# Patient Record
Sex: MALE | Race: WHITE | Employment: OTHER | URBAN - METROPOLITAN AREA
[De-identification: names, ages, dates, MRNs, and addresses within clinical notes are randomized per-mention and may not be internally consistent; named-entity substitution may affect disease eponyms.]

---

## 2017-01-18 DIAGNOSIS — E78.5 HYPERLIPIDEMIA LDL GOAL <130: Primary | ICD-10-CM

## 2017-01-18 NOTE — TELEPHONE ENCOUNTER
Rosuvastatin (CRESTOR) 40 MG tablet - Received request via fax from pharmacy medication was discontinued found in history  Last Written Prescription Date: 1/6/16  Last Fill Quantity: 90, # refills: 1  Last Office Visit with [facility], P or [facility] prescribing provider: 12/13/16       CHOL      123   12/14/2016  HDL       46   12/14/2016  LDL       34   12/14/2016  LDL       66   7/23/2011  TRIG      215   12/14/2016  CHOLHDLRATIO      3.0   1/12/2014

## 2017-01-19 NOTE — TELEPHONE ENCOUNTER
Looks like patient has been on lipitor for the past year, not sure if this is a formulary change, unable to call out to the pharmacy.

## 2017-01-26 RX ORDER — ROSUVASTATIN CALCIUM 40 MG/1
40 TABLET, COATED ORAL DAILY
Qty: 90 TABLET | Refills: 3 | Status: SHIPPED | OUTPATIENT
Start: 2017-01-26 | End: 2018-02-09

## 2017-01-26 NOTE — TELEPHONE ENCOUNTER
Patient called requesting a refill on his Crestor (see encounters) He states that he is not taking the Lipitor. Please send a new script to the pharmacy.    Emilia Berman,   [facility]

## 2017-01-26 NOTE — TELEPHONE ENCOUNTER
Pt notifies that he tried lipitor last year for a while, didn't like it(doesn't remember what SE), so switched back to Crestor 40 mg qd right back. Would like to make the change in med list & send a new rx to [facility]'s pharmacy.    D/C lipitor from med list. Sent new rx for crestor.    Maurizio, RN  Triage Nurse

## 2017-04-28 DIAGNOSIS — N40.0 BENIGN NON-NODULAR PROSTATIC HYPERPLASIA WITHOUT LOWER URINARY TRACT SYMPTOMS: ICD-10-CM

## 2017-04-28 RX ORDER — FINASTERIDE 5 MG/1
TABLET, FILM COATED ORAL
Qty: 90 TABLET | Refills: 2 | Status: SHIPPED | OUTPATIENT
Start: 2017-04-28 | End: 2017-11-16

## 2017-04-28 NOTE — TELEPHONE ENCOUNTER
Finasteride      Last Written Prescription Date: 12/13/2016  Last Fill Quantity: 90, # refills: 0    Last Office Visit with [facility], [facility] or [facility] prescribing provider:  12/13/2016   Future Office Visit:        BP Readings from Last 3 Encounters:   12/09/16 138/66   01/06/16 130/62   11/18/15 132/62     Prescription approved per [facility] Refill Protocol.    Fatou Anna RN, BSN, PHN

## 2017-07-10 ENCOUNTER — TELEPHONE (OUTPATIENT)
Dept: PEDIATRICS | Facility: CLINIC | Age: 82
End: 2017-07-10

## 2017-07-10 NOTE — TELEPHONE ENCOUNTER
Patient calling and has spots on his head that are tender to touch. States it feels like the skin is split. They are discolored. Appointment scheduled with nay.  Debbie Chu RN

## 2017-08-18 ENCOUNTER — TELEPHONE (OUTPATIENT)
Dept: PEDIATRICS | Facility: CLINIC | Age: 82
End: 2017-08-18

## 2017-08-18 DIAGNOSIS — I12.9 BENIGN HYPERTENSION WITH CKD (CHRONIC KIDNEY DISEASE) STAGE III (H): ICD-10-CM

## 2017-08-18 DIAGNOSIS — N18.30 BENIGN HYPERTENSION WITH CKD (CHRONIC KIDNEY DISEASE) STAGE III (H): ICD-10-CM

## 2017-08-18 RX ORDER — ATENOLOL 50 MG/1
50 TABLET ORAL DAILY
Qty: 30 TABLET | Refills: 0 | Status: SHIPPED | OUTPATIENT
Start: 2017-08-18 | End: 2017-09-29

## 2017-08-18 NOTE — TELEPHONE ENCOUNTER
Received a medication change request from [facility] pharmacy for pt. There's a shortage on atenolol 50 mg tablet, pharmacy is asking to switch pt to metoprolol  mg. Our [place] pharmacy has supplies but is giving out 30 days supply.  Called pt and LMTCB. Please ask pt what he would like to do.  Regla Faulkner, CMA

## 2017-08-29 ENCOUNTER — OFFICE VISIT (OUTPATIENT)
Dept: DERMATOLOGY | Facility: CLINIC | Age: 82
End: 2017-08-29
Payer: MEDICARE

## 2017-08-29 DIAGNOSIS — L82.1 SEBORRHEIC KERATOSIS: ICD-10-CM

## 2017-08-29 DIAGNOSIS — L57.0 AK (ACTINIC KERATOSIS): Primary | ICD-10-CM

## 2017-08-29 PROCEDURE — 17003 DESTRUCT PREMALG LES 2-14: CPT | Performed by: DERMATOLOGY

## 2017-08-29 PROCEDURE — 17000 DESTRUCT PREMALG LESION: CPT | Performed by: DERMATOLOGY

## 2017-08-29 PROCEDURE — 99203 OFFICE O/P NEW LOW 30 MIN: CPT | Mod: 25 | Performed by: DERMATOLOGY

## 2017-08-29 NOTE — LETTER
8/29/2017      RE: Rk Aldana  [address]  [place] [address]       DERMATOLOGY CLINIC VISIT NOTE      CHIEF COMPLAINT:  Scaling spots on head.      DERMATOLOGY PROBLEM LIST:   1.  Actinic keratoses.   2.  Seborrheic keratoses.      HISTORY OF PRESENT ILLNESS:  Mr. Aldana is an 82-year-old male seen in Dermatology Clinic at the request of Jocelyne Boyer for initial evaluation of scaling bumps on his head.  The patient denies past history of skin cancer.  He has had lesions treated with liquid nitrogen previously.  He notes a painful bump on his left scalp and scaly spots on the back of his head.  He also has scaly spots on both cheeks.  No pain or bleeding to facial lesions.      Patient Active Problem List   Diagnosis     BILATERAL CAROTID BRUITS     B-complex deficiency     BPH (benign prostatic hyperplasia)     Diverticulosis of colon with hemorrhage     Hyperlipidemia with target LDL less than 130     Advanced directives, counseling/discussion     CAD (coronary artery disease)     Hx of adenomatous colonic polyps     Prediabetes     Back pain     GERD (gastroesophageal reflux disease)     CKD (chronic kidney disease) stage 3, GFR 30-59 ml/min     Benign hypertension with CKD (chronic kidney disease) stage III     Obesity (BMI 30-39.9)     Left medial knee pain     Obesity       Allergies   Allergen Reactions     Penicillins      Pen-         Current Outpatient Prescriptions   Medication     atenolol (TENORMIN) 50 MG tablet     finasteride (PROSCAR) 5 MG tablet     rosuvastatin (CRESTOR) 40 MG tablet     fenofibrate 54 MG tablet     celecoxib (CELEBREX) 200 MG capsule     tamsulosin (FLOMAX) 0.4 MG capsule     lisinopril-hydrochlorothiazide (PRINZIDE/ZESTORETIC) 20-12.5 MG per tablet     omeprazole (PRILOSEC) 40 MG capsule     naproxen sodium (ANAPROX) 220 MG tablet     multivitamin, therapeutic with minerals (THERA-VIT-M) TABS     CVS VITAMIN B12 1000 MCG OR TABS     aspirin 81 MG tablet     No  current facility-administered medications for this visit.           REVIEW OF SYSTEMS:  Feels well without additional skin concerns today.      SOCIAL HISTORY:  The patient is retired.  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      PHYSICAL EXAMINATION:   There were no vitals taken for this visit.    GENERAL:  The patient is a healthy-appearing 82-year-old male in no distress.   HEENT:  Conjunctivae are clear.   PULMONARY:  Breathing comfortably on room air.   ABDOMEN:  No abdominal distention.   SKIN:  Exam is focused on the scalp, face and neck.  Skin exam is normal except for as follows:   -- Examination of the vertex scalp shows scattered gritty pink papules.   -- Bilateral lateral cheeks with gritty pink papules.   -- Right nasal tip with gritty pink papule.   -- Left parietal scalp with a 5 mm pink papule with overlying white crusting, firm to palpation.   -- Scattered medium and dark brown, waxy papules on the bilateral central cheeks.      ASSESSMENT AND PLAN:   1.  Actinic keratoses.  A total of 8 lesions treated with 10-second freeze/thaw cycle with liquid nitrogen.  Wound care instruction provided.  We will recheck lesion on the left frontal scalp in 2 months' time to ensure resolution.  If not resolved, biopsy would be indicated.     2.  Seborrheic keratoses, benign hyperkeratotic papules.  No treatment advised.      The patient to return in 2 months' time for reevaluation of aks.      Thank you for this consultation.       Lupe Hyatt MD  Dermatology Staff       cc:   Jocelyne Boyer MD   [address] [place]  [address]         LUPE HYATT MD             D: 2017 16:22   T: 2017 07:40   MT: andrea      Name:     BILLY SHELLEY   MRN:      [id number]        Account:      [id number]   :      1935           Service Date: 2017      Document: [id number]

## 2017-08-29 NOTE — NURSING NOTE
"Chief Complaint   Patient presents with     Consult     lesions on scalp that burn when touched, dry spots on both cheeks  tx: none        Initial There were no vitals taken for this visit. Estimated body mass index is 33.62 kg/(m^2) as calculated from the following:    Height as of 12/9/16: 5' 6\" (167.6 cm).    Weight as of 12/9/16: 208 lb 5 oz (94.5 kg).  Medication Reconciliation: complete   Bushra Galvez MA      "

## 2017-08-30 NOTE — PROGRESS NOTES
DERMATOLOGY CLINIC VISIT NOTE      CHIEF COMPLAINT:  Scaling spots on head.      DERMATOLOGY PROBLEM LIST:   1.  Actinic keratoses.   2.  Seborrheic keratoses.      HISTORY OF PRESENT ILLNESS:  Mr. Aldana is an 82-year-old male seen in Dermatology Clinic at the request of Jocelyne Boyer for initial evaluation of scaling bumps on his head.  The patient denies past history of skin cancer.  He has had lesions treated with liquid nitrogen previously.  He notes a painful bump on his left scalp and scaly spots on the back of his head.  He also has scaly spots on both cheeks.  No pain or bleeding to facial lesions.      Patient Active Problem List   Diagnosis     BILATERAL CAROTID BRUITS     B-complex deficiency     BPH (benign prostatic hyperplasia)     Diverticulosis of colon with hemorrhage     Hyperlipidemia with target LDL less than 130     Advanced directives, counseling/discussion     CAD (coronary artery disease)     Hx of adenomatous colonic polyps     Prediabetes     Back pain     GERD (gastroesophageal reflux disease)     CKD (chronic kidney disease) stage 3, GFR 30-59 ml/min     Benign hypertension with CKD (chronic kidney disease) stage III     Obesity (BMI 30-39.9)     Left medial knee pain     Obesity       Allergies   Allergen Reactions     Penicillins      Pen-         Current Outpatient Prescriptions   Medication     atenolol (TENORMIN) 50 MG tablet     finasteride (PROSCAR) 5 MG tablet     rosuvastatin (CRESTOR) 40 MG tablet     fenofibrate 54 MG tablet     celecoxib (CELEBREX) 200 MG capsule     tamsulosin (FLOMAX) 0.4 MG capsule     lisinopril-hydrochlorothiazide (PRINZIDE/ZESTORETIC) 20-12.5 MG per tablet     omeprazole (PRILOSEC) 40 MG capsule     naproxen sodium (ANAPROX) 220 MG tablet     multivitamin, therapeutic with minerals (THERA-VIT-M) TABS     CVS VITAMIN B12 1000 MCG OR TABS     aspirin 81 MG tablet     No current facility-administered medications for this visit.           REVIEW OF  SYSTEMS:  Feels well without additional skin concerns today.      SOCIAL HISTORY:  The patient is retired.  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      PHYSICAL EXAMINATION:   There were no vitals taken for this visit.    GENERAL:  The patient is a healthy-appearing 82-year-old male in no distress.   HEENT:  Conjunctivae are clear.   PULMONARY:  Breathing comfortably on room air.   ABDOMEN:  No abdominal distention.   SKIN:  Exam is focused on the scalp, face and neck.  Skin exam is normal except for as follows:   -- Examination of the vertex scalp shows scattered gritty pink papules.   -- Bilateral lateral cheeks with gritty pink papules.   -- Right nasal tip with gritty pink papule.   -- Left parietal scalp with a 5 mm pink papule with overlying white crusting, firm to palpation.   -- Scattered medium and dark brown, waxy papules on the bilateral central cheeks.      ASSESSMENT AND PLAN:   1.  Actinic keratoses.  A total of 8 lesions treated with 10-second freeze/thaw cycle with liquid nitrogen.  Wound care instruction provided.  We will recheck lesion on the left frontal scalp in 2 months' time to ensure resolution.  If not resolved, biopsy would be indicated.     2.  Seborrheic keratoses, benign hyperkeratotic papules.  No treatment advised.      The patient to return in 2 months' time for reevaluation of aks.      Thank you for this consultation.       Lupe Hyatt MD  Dermatology Staff       cc:   Jocelyne Boyer MD   [address] [place]  [address]         LUPE HYATT MD             D: 2017 16:22   T: 2017 07:40   MT: andrea      Name:     BILLY SHELLEY   MRN:      [id number]        Account:      [id number]   :      1935           Service Date: 2017      Document: [id number]

## 2017-09-22 ENCOUNTER — TELEPHONE (OUTPATIENT)
Dept: PEDIATRICS | Facility: CLINIC | Age: 82
End: 2017-09-22

## 2017-09-22 NOTE — TELEPHONE ENCOUNTER
Patient was stung by a bee a few minutes ago and it is swelling and hurting a bit. Wants to know what he can use as he doesn't have any antihistamines at home. Doesn't want to leave his wife alone. Advised that if he had breathing difficulty, scratchy throat or other concerning symptoms to call 911. He will do this. Advised to try a baking soda paste and ice. Recommended he have someone  some benadryl for him.   Debbie Chu RN

## 2017-09-29 DIAGNOSIS — I12.9 BENIGN HYPERTENSION WITH CKD (CHRONIC KIDNEY DISEASE) STAGE III (H): ICD-10-CM

## 2017-09-29 DIAGNOSIS — N18.30 BENIGN HYPERTENSION WITH CKD (CHRONIC KIDNEY DISEASE) STAGE III (H): ICD-10-CM

## 2017-09-29 RX ORDER — ATENOLOL 50 MG/1
TABLET ORAL
Qty: 30 TABLET | Refills: 0 | Status: SHIPPED | OUTPATIENT
Start: 2017-09-29 | End: 2018-01-18

## 2017-09-29 NOTE — TELEPHONE ENCOUNTER
atenolol (TENORMIN) 50 MG tablet      Last Written Prescription Date: 8/18/2017  Last Fill Quantity: 30, # refills: 0    Last Office Visit with [facility], [facility] or [facility] prescribing provider:  12/13/2016   Future Office Visit:    Next 5 appointments (look out 90 days)     Oct 24, 2017  1:15 PM CDT   [facility]    [address] [place] [id number] [phone]                    BP Readings from Last 3 Encounters:   12/09/16 138/66   01/06/16 130/62   11/18/15 132/62

## 2017-10-10 ENCOUNTER — TELEPHONE (OUTPATIENT)
Dept: PEDIATRICS | Facility: CLINIC | Age: 82
End: 2017-10-10

## 2017-10-10 ENCOUNTER — OFFICE VISIT (OUTPATIENT)
Dept: PEDIATRICS | Facility: CLINIC | Age: 82
End: 2017-10-10
Payer: MEDICARE

## 2017-10-10 VITALS
DIASTOLIC BLOOD PRESSURE: 60 MMHG | BODY MASS INDEX: 32.58 KG/M2 | SYSTOLIC BLOOD PRESSURE: 128 MMHG | WEIGHT: 207.6 LBS | HEART RATE: 68 BPM | HEIGHT: 67 IN | TEMPERATURE: 95.9 F | OXYGEN SATURATION: 94 %

## 2017-10-10 DIAGNOSIS — J20.9 ACUTE BRONCHITIS, UNSPECIFIED ORGANISM: Primary | ICD-10-CM

## 2017-10-10 PROCEDURE — 99214 OFFICE O/P EST MOD 30 MIN: CPT | Performed by: INTERNAL MEDICINE

## 2017-10-10 RX ORDER — AZITHROMYCIN 250 MG/1
TABLET, FILM COATED ORAL
Qty: 6 TABLET | Refills: 0 | Status: SHIPPED | OUTPATIENT
Start: 2017-10-10 | End: 2017-10-21

## 2017-10-10 NOTE — PATIENT INSTRUCTIONS
If things are not improving or getting worse I will need you to come back into clinic for an Xray.

## 2017-10-10 NOTE — PROGRESS NOTES
SUBJECTIVE:   Rk Aldana is a 82 year old male who presents to clinic today for the following health issues:    Rk is a patient with a complex PMHx for the complaint of cough, wheezing, chills, fatigue/malaise, hoarse voice and possible fever. About 1 week ago he developed a sore throat that went away and developed a nonproductive cough. A few days later his wife was hospitalized for pneumonia. Reports his cough hurts really bad, and he has some lightheadedness with cough. He reports getting short of breath after carrying laundry up the stairs. Patient notices cold sensations on his hands. Reports heavy mucus but has not looked at it.  Denies fevers, sinus pressure, maxillofacial pain and chest pain.         RESPIRATORY SYMPTOMS      Duration: 1 week    Description  cough, wheezing, chills, fatigue/malaise, hoarse voice and possible fever, but doesn't take temperature    Severity: moderate    Accompanying signs and symptoms: None    History (predisposing factors):  Wife is in hospital with pneumonia     Precipitating or alleviating factors: None    Therapies tried and outcome:  Guaifenesin, has been helping             Problem list and histories reviewed & adjusted, as indicated.  Additional history: as documented    Patient Active Problem List   Diagnosis     BILATERAL CAROTID BRUITS     B-complex deficiency     BPH (benign prostatic hyperplasia)     Diverticulosis of colon with hemorrhage     Hyperlipidemia with target LDL less than 130     Advanced directives, counseling/discussion     CAD (coronary artery disease)     Hx of adenomatous colonic polyps     Prediabetes     Back pain     GERD (gastroesophageal reflux disease)     CKD (chronic kidney disease) stage 3, GFR 30-59 ml/min     Benign hypertension with CKD (chronic kidney disease) stage III     Obesity (BMI 30-39.9)     Left medial knee pain     Obesity     AK (actinic keratosis)     Seborrheic keratosis     Past Surgical History:   Procedure  Laterality Date     SURGICAL HISTORY OF -   03/27/00    Colonoscopy       Social History   Substance Use Topics     Smoking status: Never Smoker     Smokeless tobacco: Never Used     Alcohol use Yes      Comment: rarely     Family History   Problem Relation Age of Onset     Connective Tissue Disorder Mother      LUPUS     Cancer - colorectal Father      70         Current Outpatient Prescriptions   Medication Sig Dispense Refill     atenolol (TENORMIN) 50 MG tablet TAKE ONE TABLET BY MOUTH EVERY DAY 30 tablet 0     finasteride (PROSCAR) 5 MG tablet TAKE ONE TABLET BY MOUTH ONCE DAILY 90 tablet 2     rosuvastatin (CRESTOR) 40 MG tablet Take 1 tablet (40 mg) by mouth daily 90 tablet 3     fenofibrate 54 MG tablet Take 1 tablet (54 mg) by mouth daily 90 tablet 3     celecoxib (CELEBREX) 200 MG capsule Take 1 capsule (200 mg) by mouth daily as needed for moderate pain 90 capsule 3     tamsulosin (FLOMAX) 0.4 MG capsule Take 1 capsule (0.4 mg) by mouth daily 90 capsule 3     lisinopril-hydrochlorothiazide (PRINZIDE/ZESTORETIC) 20-12.5 MG per tablet Take 2 tablets by mouth daily 180 tablet 3     omeprazole (PRILOSEC) 40 MG capsule Take 1 capsule (40 mg) by mouth daily 90 capsule 3     naproxen sodium (ANAPROX) 220 MG tablet Take 1 tablet (220 mg) by mouth 2 times daily (with meals) 180 tablet 3     multivitamin, therapeutic with minerals (THERA-VIT-M) TABS Take 1 tablet by mouth daily       CVS VITAMIN B12 1000 MCG OR TABS 1 tablet daily       aspirin 81 MG tablet Take by mouth daily.  30 0     Allergies   Allergen Reactions     Penicillins      Pen-     Seasonal Allergies          Reviewed and updated as needed this visit by clinical staffTobacco  Allergies  Meds  Med Hx  Surg Hx  Fam Hx  Soc Hx      Reviewed and updated as needed this visit by Provider         ROS:  Constitutional, HEENT, cardiovascular, pulmonary, gi and gu systems are negative, except as otherwise noted.    GENERAL POSITIVE for malaise   HEENT  "POSITIVE for sore throat   RESP POSITIVE for cough, shortness of breath    NEURO POSITIVE for light headedness, dizziness    This document serves as a record of the services and decisions personally performed and made by Jocelyne Boyer MD. It was created on her behalf by Sri Jenkins, a trained medical scribe. The creation of this document is based the provider's statements to the medical scribe.    Sri Jenkins October 10, 2017 11:18 AM  OBJECTIVE:   /60 (BP Location: Right arm, Patient Position: Sitting, Cuff Size: Adult Regular)  Pulse 68  Temp 95.9  F (35.5  C) (Tympanic)  Ht 1.695 m (5' 6.75\")  Wt 94.2 kg (207 lb 9.6 oz)  SpO2 94%  BMI 32.76 kg/m2  Body mass index is 32.76 kg/(m^2).  GENERAL: , alert and no distress  HENT: ear canals and TM's normal, nose and mouth without ulcers or lesions  NECK: no adenopathy, no asymmetry, masses, or scars and thyroid normal to palpation  RESP: lungs clear to auscultation - no rales, rhonchi or wheezes  CV: regular rate and rhythm, normal S1 S2, no S3 or S4, no murmur, click or rub, no peripheral edema and peripheral pulses strong  PSYCH: mentation appears normal, affect normal/bright      Diagnostic Test Results:  none     ASSESSMENT/PLAN:     (J20.9) Acute bronchitis, unspecified organism  (primary encounter diagnosis)  -- will start Zithromax due to duration and lack of improvement   -reviewed potential side effects of meds  -discussed signs of respiratory distress and reasons to seek further care  -- discussed if sx are not improving or worsening; come back into clinic for chest Xray   Plan: azithromycin (ZITHROMAX) 250 MG tablet             Follow up if symptoms are not improving or worsening     The information in this document, created by the medical scribe for me, accurately reflects the services I personally performed and the decisions made by me. I have reviewed and approved this document for accuracy prior to leaving the patient care " area.  Jocelyne Boyer MD  [facility]

## 2017-10-10 NOTE — TELEPHONE ENCOUNTER
Wondering if he should be seen     Has productive cough for about 1 week. Denies SOB. Denies chest pain/discomfort with cough. Denies weakness. Some fatigue.    Using OTC cough medicine and decongestion for the last week. But cough is lingering on.     Does not seem to have a fever or chills. Has not actually checked his temperature. Wife currently in hospital with pneumonia.    Advised to be seen in clinic for proper lung assessment. Triage overheard cough constant and hacking. Did not overhear patient in distress. Patient AxOx3.      OV scheduled with PCP today. Patient agreed to plan.    Fatou DELGADO RN, BSN, PHN  [place] [facility]

## 2017-10-21 ENCOUNTER — OFFICE VISIT (OUTPATIENT)
Dept: URGENT CARE | Facility: URGENT CARE | Age: 82
End: 2017-10-21
Payer: MEDICARE

## 2017-10-21 ENCOUNTER — NURSE TRIAGE (OUTPATIENT)
Dept: NURSING | Facility: CLINIC | Age: 82
End: 2017-10-21

## 2017-10-21 ENCOUNTER — RADIANT APPOINTMENT (OUTPATIENT)
Dept: GENERAL RADIOLOGY | Facility: CLINIC | Age: 82
End: 2017-10-21
Attending: NURSE PRACTITIONER
Payer: MEDICARE

## 2017-10-21 VITALS
HEART RATE: 64 BPM | SYSTOLIC BLOOD PRESSURE: 126 MMHG | DIASTOLIC BLOOD PRESSURE: 76 MMHG | OXYGEN SATURATION: 95 % | RESPIRATION RATE: 12 BRPM | TEMPERATURE: 98.2 F

## 2017-10-21 DIAGNOSIS — R05.9 COUGH: Primary | ICD-10-CM

## 2017-10-21 LAB
ERYTHROCYTE [DISTWIDTH] IN BLOOD BY AUTOMATED COUNT: 13.4 % (ref 10–15)
HCT VFR BLD AUTO: 40 % (ref 40–53)
HGB BLD-MCNC: 13.3 G/DL (ref 13.3–17.7)
MCH RBC QN AUTO: 31.1 PG (ref 26.5–33)
MCHC RBC AUTO-ENTMCNC: 33.3 G/DL (ref 31.5–36.5)
MCV RBC AUTO: 94 FL (ref 78–100)
PLATELET # BLD AUTO: 222 10E9/L (ref 150–450)
RBC # BLD AUTO: 4.27 10E12/L (ref 4.4–5.9)
WBC # BLD AUTO: 5.8 10E9/L (ref 4–11)

## 2017-10-21 PROCEDURE — 36415 COLL VENOUS BLD VENIPUNCTURE: CPT | Performed by: NURSE PRACTITIONER

## 2017-10-21 PROCEDURE — 85027 COMPLETE CBC AUTOMATED: CPT | Performed by: NURSE PRACTITIONER

## 2017-10-21 PROCEDURE — 71020 XR CHEST 2 VW: CPT

## 2017-10-21 PROCEDURE — 99214 OFFICE O/P EST MOD 30 MIN: CPT | Performed by: NURSE PRACTITIONER

## 2017-10-21 RX ORDER — LEVOFLOXACIN 500 MG/1
500 TABLET, FILM COATED ORAL DAILY
Qty: 7 TABLET | Refills: 0 | Status: SHIPPED | OUTPATIENT
Start: 2017-10-21 | End: 2017-10-21

## 2017-10-21 RX ORDER — BENZONATATE 200 MG/1
200 CAPSULE ORAL 3 TIMES DAILY PRN
Qty: 21 CAPSULE | Refills: 0 | Status: SHIPPED | OUTPATIENT
Start: 2017-10-21 | End: 2018-01-18

## 2017-10-21 RX ORDER — DOXYCYCLINE 100 MG/1
100 CAPSULE ORAL 2 TIMES DAILY
Qty: 20 CAPSULE | Refills: 0 | Status: SHIPPED | OUTPATIENT
Start: 2017-10-21 | End: 2018-01-18

## 2017-10-21 NOTE — PROGRESS NOTES
SUBJECTIVE:  Rk Aldana is a 82 year old male who presents to the clinic today with a chief complaint of cough  and central chest pain (mediastinal, not crushing or radiating, more like a tightness and warmth per patient) for 3 week(s).  His cough is described as persistent, productive but patient is unable to describe color or consistency as he states he swallows it immediately despite knowing he should attempt to expectorate it. The patient's symptoms are moderate and not changing over the course of time.  Associated symptoms include none. The patient's symptoms are exacerbated by no particular triggers. Patient has been using nothing lately, but did have a five-day course of Azithromycin that ended six days ago to improve symptoms. Patient states he does not pay enough attention to know if things got any better after taking the antibiotic.    Past Medical History:   Diagnosis Date     CHEST PAIN NOS     Echo- mild LVH 8/06. GXT cardiolite 8/06- 10.4 mets, 158 HR, nondiagnostic ekg, 1/10 chest pain, cardiolite- small fixed inferior defect consistent with attenuation, EF 67%      HERPES ZOSTER NOS 10/30/2006    Right flank T8     ROTATOR CUFF SYND NOS     Partial tear left supraspinatus MRI 4/04       Current Outpatient Prescriptions   Medication Sig Dispense Refill     azithromycin (ZITHROMAX) 250 MG tablet Two tablets first day, then one tablet daily for four days. 6 tablet 0     atenolol (TENORMIN) 50 MG tablet TAKE ONE TABLET BY MOUTH EVERY DAY 30 tablet 0     finasteride (PROSCAR) 5 MG tablet TAKE ONE TABLET BY MOUTH ONCE DAILY 90 tablet 2     rosuvastatin (CRESTOR) 40 MG tablet Take 1 tablet (40 mg) by mouth daily 90 tablet 3     fenofibrate 54 MG tablet Take 1 tablet (54 mg) by mouth daily 90 tablet 3     celecoxib (CELEBREX) 200 MG capsule Take 1 capsule (200 mg) by mouth daily as needed for moderate pain 90 capsule 3     tamsulosin (FLOMAX) 0.4 MG capsule Take 1 capsule (0.4 mg) by mouth daily 90  "capsule 3     lisinopril-hydrochlorothiazide (PRINZIDE/ZESTORETIC) 20-12.5 MG per tablet Take 2 tablets by mouth daily 180 tablet 3     omeprazole (PRILOSEC) 40 MG capsule Take 1 capsule (40 mg) by mouth daily 90 capsule 3     naproxen sodium (ANAPROX) 220 MG tablet Take 1 tablet (220 mg) by mouth 2 times daily (with meals) 180 tablet 3     multivitamin, therapeutic with minerals (THERA-VIT-M) TABS Take 1 tablet by mouth daily       CVS VITAMIN B12 1000 MCG OR TABS 1 tablet daily       aspirin 81 MG tablet Take by mouth daily.  30 0       Social History   Substance Use Topics     Smoking status: Never Smoker     Smokeless tobacco: Never Used     Alcohol use Yes      Comment: rarely       ROS  Review of systems negative except as stated above.    OBJECTIVE:  /76 (BP Location: Right arm, Patient Position: Sitting, Cuff Size: Adult Regular)  Pulse 64  Temp 98.2  F (36.8  C) (Oral)  Resp 12  SpO2 95%  GENERAL APPEARANCE: healthy, alert and no distress  EYES: EOMI,  PERRL, conjunctiva clear  HENT: ear canals and TM's normal.  Nose and mouth without ulcers, erythema or lesions  NECK: supple, nontender, no lymphadenopathy  CV: regular rates and rhythm, normal S1 S2, no murmur noted  LUNG: Bilateral upper lungs with expiratory wheezes, lower & mid lobes clear  ABDOMEN:  soft, nontender, no HSM or masses and bowel sounds normal  NEURO: gait abnormal slight instability and shuffling gate upon standing (patient notes \"problem with right achilles\") , then straightens out to normal  SKIN: no suspicious lesions or rashes  PSYCH: affect flat, judgment and insight intact, worried - cantankerous     ASSESSMENT:  Symptoms most consistent with acute bronchitis vs. URI vs. Other    X-ray not consistent with pneumonia, CBC WNL.     PLAN: Due to symptom duration of three weeks and recent bacterial pneumonia exposure, started on   Doxycycline and provided with antitussive (tessalon pearls) . Educated patient that if no " improvement likely viral in origin and will not benefit from additional antibiotics.     See orders in Epic  Symptomatic measures encouraged, humidified air, plenty of fluids. Advised to wash hands and wear mask when at TCU visiting wife to prevent spread of infection.    I have reviewed and edited the NP student's documentation acting as scribe and verify that making the history, exam and medical decision making reflect the history, exam and medical decision making preformed by myself today.     Priti Ashley RN, CNP

## 2017-10-21 NOTE — MR AVS SNAPSHOT
After Visit Summary   10/21/2017    Rk Aldana    MRN: [id number]           Patient Information     Date Of Birth          1935        Visit Information        Provider Department      10/21/2017 9:30 AM Priti Ashley APRN CNP [facility]        Today's Diagnoses     Cough    -  1       Follow-ups after your visit        Your next 10 appointments already scheduled     Oct 24, 2017  1:15 PM CDT   Return Visit with Lupe Hyatt MD   [facility]] ([facility]])    [address] [place] [address]  [address]  [facility] [address] [phone]            Oct 24, 2017  2:40 PM CDT   Office Visit with Jocelyne Boyer MD   [facility]3] ([facility]])    [address] [place] [address]  [address]  [facility] [address] [phone]           Bring a current list of meds and any records pertaining to this visit. For Physicals, please bring immunization records and any forms needing to be filled out. Please arrive 10 minutes early to complete paperwork.              Who to contact     If you have questions or need follow up information about today's clinic visit or your schedule please contact [facility] directly at [phone].  Normal or non-critical lab and imaging results will be communicated to you by Adaptive Digital Powerhart, letter or phone within 4 business days after the clinic has received the results. If you do not hear from us within 7 days, please contact the clinic through Adaptive Digital Powerhart or phone. If you have a critical or abnormal lab result, we will notify you by phone as soon as possible.  Submit refill requests through Rebit or call your pharmacy and they will forward the refill request to us. Please allow 3 business days for your refill to be completed.          Additional Information About Your Visit        Adaptive Digital Powerhart Information     Rebit gives you secure access to your electronic health record. If you see a  primary care provider, you can also send messages to your care team and make appointments. If you have questions, please call your primary care clinic.  If you do not have a primary care provider, please call [phone] and they will assist you.        Care EveryWhere ID     This is your Care EveryWhere ID. This could be used by other organizations to access your [facility] medical records  [id number]        Your Vitals Were     Pulse Temperature Respirations Pulse Oximetry          64 98.2  F (36.8  C) (Oral) 12 95%         Blood Pressure from Last 3 Encounters:   10/21/17 126/76   10/10/17 128/60   12/09/16 138/66    Weight from Last 3 Encounters:   10/10/17 207 lb 9.6 oz (94.2 kg)   12/09/16 208 lb 5 oz (94.5 kg)   01/06/16 212 lb 3 oz (96.2 kg)              We Performed the Following     CBC with platelets     XR Chest 2 Views          Today's Medication Changes          These changes are accurate as of: 10/21/17 12:22 PM.  If you have any questions, ask your nurse or doctor.               Start taking these medicines.        Dose/Directions    benzonatate 200 MG capsule   Commonly known as:  TESSALON   Used for:  Cough   Started by:  Priti Ashley APRN CNP        Dose:  200 mg   Take 1 capsule (200 mg) by mouth 3 times daily as needed for cough   Quantity:  21 capsule   Refills:  0       doxycycline 100 MG capsule   Commonly known as:  VIBRAMYCIN   Used for:  Cough   Started by:  Priti Ashley APRN CNP        Dose:  100 mg   Take 1 capsule (100 mg) by mouth 2 times daily   Quantity:  20 capsule   Refills:  0            Where to get your medicines      These medications were sent to [facility] - [address]   [address], [place] [address]     Phone:  [phone]     benzonatate 200 MG capsule    doxycycline 100 MG capsule                Primary Care Provider Office Phone # Fax #    Jocelyne Boyer [phone]  [phone]       [address] [place] [address]        Equal Access to Services     [facility]: Hadii carlos graves mirnatrip Lorenzoali, waaxda lubasiliaadaha, qaybta marizoldanielzohaib hurtadoashleezohaib, waxay idiin hayindigobrock obregonessieendy hurd. So [facility] [phone].    ATENCIÓN: Si habla español, tiene a dickey disposición servicios gratuitos de asistencia lingüística. Llame al [phone].    We comply with applicable federal civil rights laws and [place] laws. We do not discriminate on the basis of race, color, national origin, age, disability, sex, sexual orientation, or gender identity.            Thank you!     Thank you for choosing [facility]  for your care. Our goal is always to provide you with excellent care. Hearing back from our patients is one way we can continue to improve our services. Please take a few minutes to complete the written survey that you may receive in the mail after your visit with us. Thank you!             Your Updated Medication List - Protect others around you: Learn how to safely use, store and throw away your medicines at www.disposemymeds.org.          This list is accurate as of: 10/21/17 12:22 PM.  Always use your most recent med list.                   Brand Name Dispense Instructions for use Diagnosis    aspirin 81 MG tablet     30    Take by mouth daily.        atenolol 50 MG tablet    TENORMIN    30 tablet    TAKE ONE TABLET BY MOUTH EVERY DAY    Benign hypertension with CKD (chronic kidney disease) stage III       benzonatate 200 MG capsule    TESSALON    21 capsule    Take 1 capsule (200 mg) by mouth 3 times daily as needed for cough    Cough       celecoxib 200 MG capsule    celeBREX    90 capsule    Take 1 capsule (200 mg) by mouth daily as needed for moderate pain    Osteoarthritis, unspecified osteoarthritis type, unspecified site       [facility] vitamin  B12 1000 MCG Tabs   Generic drug:  cyanocobalamin      1 tablet daily        doxycycline 100 MG capsule    VIBRAMYCIN     20 capsule    Take 1 capsule (100 mg) by mouth 2 times daily    Cough       fenofibrate 54 MG tablet     90 tablet    Take 1 tablet (54 mg) by mouth daily    Hyperlipidemia LDL goal <130       finasteride 5 MG tablet    PROSCAR    90 tablet    TAKE ONE TABLET BY MOUTH ONCE DAILY    Benign non-nodular prostatic hyperplasia without lower urinary tract symptoms       lisinopril-hydrochlorothiazide 20-12.5 MG per tablet    PRINZIDE/ZESTORETIC    180 tablet    Take 2 tablets by mouth daily    Benign hypertension with CKD (chronic kidney disease) stage III       multivitamin, therapeutic with minerals Tabs tablet      Take 1 tablet by mouth daily        naproxen sodium 220 MG tablet    ANAPROX    180 tablet    Take 1 tablet (220 mg) by mouth 2 times daily (with meals)    Primary osteoarthritis involving multiple joints       omeprazole 40 MG capsule    priLOSEC    90 capsule    Take 1 capsule (40 mg) by mouth daily    Gastroesophageal reflux disease without esophagitis       rosuvastatin 40 MG tablet    CRESTOR    90 tablet    Take 1 tablet (40 mg) by mouth daily    Hyperlipidemia LDL goal <130       tamsulosin 0.4 MG capsule    FLOMAX    90 capsule    Take 1 capsule (0.4 mg) by mouth daily    Benign non-nodular prostatic hyperplasia without lower urinary tract symptoms

## 2017-10-21 NOTE — TELEPHONE ENCOUNTER
Rk has had a cough for three weeks.  Now has congestion and sore throat.  Rk head does not feel warm.  Rk states that his wife also is sick and went to hospital and now TCU.

## 2017-10-21 NOTE — TELEPHONE ENCOUNTER
Reason for Disposition    [1] Continuous (nonstop) coughing interferes with work or school AND [2] no improvement using cough treatment per Care Advice    Additional Information    Negative: Severe difficulty breathing (e.g., struggling for each breath, speaks in single words)    Negative: Bluish lips, tongue, or face now    Negative: [1] Difficulty breathing AND [2] exposure to flames, smoke, or fumes    Negative: [1] Stridor AND [2] difficulty breathing    Negative: Sounds like a life-threatening emergency to the triager    Negative: [1] Previous asthma attacks AND [2] this feels like asthma attack    Negative: Dry (non-productive) cough (i.e., no sputum or minimal clear sputum)    Negative: Chest pain  (Exception: MILD central chest pain, present only when coughing)    Negative: Difficulty breathing    Negative: Patient sounds very sick or weak to the triager    Negative: [1] Coughed up blood AND [2] > 1 tablespoon (15 ml) (Exception: blood-tinged sputum)    Negative: Fever > 103 F (39.4 C)    Negative: [1] Fever > 101 F (38.3 C) AND [2] age > 60    Negative: [1] Fever > 101 F (38.3 C) AND [2] bedridden (e.g., nursing home patient, CVA, chronic illness, recovering from surgery)    Negative: [1] Fever > 100.5 F (38.1 C) AND [2] diabetes mellitus or weak immune system (e.g., HIV positive, cancer chemo, splenectomy, organ transplant, chronic steroids)    Negative: Wheezing is present    Negative: SEVERE coughing spells (e.g., whooping sound after coughing, vomiting after coughing)    Protocols used: COUGH - ACUTE PRODUCTIVE-ADULT-

## 2017-10-24 ENCOUNTER — OFFICE VISIT (OUTPATIENT)
Dept: PEDIATRICS | Facility: CLINIC | Age: 82
End: 2017-10-24
Payer: MEDICARE

## 2017-10-24 ENCOUNTER — OFFICE VISIT (OUTPATIENT)
Dept: DERMATOLOGY | Facility: CLINIC | Age: 82
End: 2017-10-24
Payer: MEDICARE

## 2017-10-24 VITALS — DIASTOLIC BLOOD PRESSURE: 76 MMHG | OXYGEN SATURATION: 95 % | HEART RATE: 64 BPM | SYSTOLIC BLOOD PRESSURE: 126 MMHG

## 2017-10-24 DIAGNOSIS — R05.9 COUGH: Primary | ICD-10-CM

## 2017-10-24 DIAGNOSIS — L82.1 SEBORRHEIC KERATOSIS: Primary | ICD-10-CM

## 2017-10-24 DIAGNOSIS — L57.0 AK (ACTINIC KERATOSIS): ICD-10-CM

## 2017-10-24 PROCEDURE — 99214 OFFICE O/P EST MOD 30 MIN: CPT | Mod: 25 | Performed by: INTERNAL MEDICINE

## 2017-10-24 PROCEDURE — 17003 DESTRUCT PREMALG LES 2-14: CPT | Performed by: DERMATOLOGY

## 2017-10-24 PROCEDURE — 17000 DESTRUCT PREMALG LESION: CPT | Performed by: DERMATOLOGY

## 2017-10-24 PROCEDURE — 99213 OFFICE O/P EST LOW 20 MIN: CPT | Mod: 25 | Performed by: DERMATOLOGY

## 2017-10-24 NOTE — PROGRESS NOTES
DERMATOLOGY CLINIC VISIT NOTE      CHIEF COMPLAINT:  Scaling spots on head.      DERMATOLOGY PROBLEM LIST:   1.  Actinic keratoses.   2.  Seborrheic keratoses.      HISTORY OF PRESENT ILLNESS:  Mr. Aldana is an 82-year-old male seen for follow up of aks. Seen 2 months ago and liquid nitrogen applied. Due to the thickness of a lesion on the L scalp I asked that he come back to have a recheck of the area. Notes ongoing scaling at the site. No bleeding areas. Has scattered bumps that are not symptomatic on the back and face.      Patient Active Problem List   Diagnosis     BILATERAL CAROTID BRUITS     B-complex deficiency     BPH (benign prostatic hyperplasia)     Diverticulosis of colon with hemorrhage     Hyperlipidemia with target LDL less than 130     Advanced directives, counseling/discussion     CAD (coronary artery disease)     Hx of adenomatous colonic polyps     Prediabetes     Back pain     GERD (gastroesophageal reflux disease)     CKD (chronic kidney disease) stage 3, GFR 30-59 ml/min     Benign hypertension with CKD (chronic kidney disease) stage III     Obesity (BMI 30-39.9)     Left medial knee pain     Obesity     AK (actinic keratosis)     Seborrheic keratosis       Allergies   Allergen Reactions     Penicillins      Pen-     Seasonal Allergies          Current Outpatient Prescriptions   Medication     Multiple Vitamins-Minerals (ZINC PO)     Ascorbic Acid (VITAMIN C PO)     GuaiFENesin (MUCINEX PO)     benzonatate (TESSALON) 200 MG capsule     doxycycline (VIBRAMYCIN) 100 MG capsule     atenolol (TENORMIN) 50 MG tablet     finasteride (PROSCAR) 5 MG tablet     rosuvastatin (CRESTOR) 40 MG tablet     fenofibrate 54 MG tablet     celecoxib (CELEBREX) 200 MG capsule     tamsulosin (FLOMAX) 0.4 MG capsule     lisinopril-hydrochlorothiazide (PRINZIDE/ZESTORETIC) 20-12.5 MG per tablet     omeprazole (PRILOSEC) 40 MG capsule     naproxen sodium (ANAPROX) 220 MG tablet     multivitamin, therapeutic with  minerals (THERA-VIT-M) TABS     CVS VITAMIN B12 1000 MCG OR TABS     aspirin 81 MG tablet     No current facility-administered medications for this visit.           REVIEW OF SYSTEMS:  Feels well without additional skin concerns today.      SOCIAL HISTORY:  The patient is retired.  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      PHYSICAL EXAMINATION:   There were no vitals taken for this visit.    GENERAL:  The patient is a healthy-appearing 82-year-old male in no distress.   HEENT:  Conjunctivae are clear.   PULMONARY:  Breathing comfortably on room air.   ABDOMEN:  No abdominal distention.   SKIN:  Exam is focused on the scalp, face, back, chest, and neck.  Skin exam is normal except for as follows:   -- Examination of the vertex scalp shows scattered gritty pink papules.   -- L lower cheek with scaly papule  -- Right nasal tip with gritty pink papule.   -- Left frontal scalp with gritty papule   -- Scattered medium and dark brown, waxy papules on the bilateral central cheeks, upper and lower back, upper chest, abdomen     ASSESSMENT AND PLAN:   1.  Actinic keratoses.  A total of 6 lesions treated with 10-second freeze/thaw cycle with liquid nitrogen.  Wound care instruction provided.  Lesion on the L scalp is very flat without induration today.     2.  Seborrheic keratoses, benign hyperkeratotic papules.  No treatment advised.      The patient to return in 12 months' time for reevaluation, sooner prn.           Lupe Hyatt MD  Dermatology Staff       cc:   Jocelyne Boyer MD   [address] [place]  [address]         LUPE HYATT MD             D: 2017 16:22   T: 2017 07:40   MT: andrea      Name:     BILLY SHELLEY   MRN:      [id number]        Account:      [id number]   :      1935           Service Date: 2017      Document: [id number]

## 2017-10-24 NOTE — MR AVS SNAPSHOT
After Visit Summary   10/24/2017    Rk Aldana    MRN: [id number]           Patient Information     Date Of Birth          1935        Visit Information        Provider Department      10/24/2017 2:40 PM Jocelyne Boyer MD [facility]        Today's Diagnoses     Cough    -  1      Care Instructions    If you feel like there is an acute change or she is worsening then you can ask the nurses to have her go back into the emergency room.     Ask to meet with the nurse practitioner and the care team meeting to see what they think about hospice or other care.             Follow-ups after your visit        Who to contact     If you have questions or need follow up information about today's clinic visit or your schedule please contact [facility] directly at [phone].  Normal or non-critical lab and imaging results will be communicated to you by MyChart, letter or phone within 4 business days after the clinic has received the results. If you do not hear from us within 7 days, please contact the clinic through Knodahart or phone. If you have a critical or abnormal lab result, we will notify you by phone as soon as possible.  Submit refill requests through BalaBit or call your pharmacy and they will forward the refill request to us. Please allow 3 business days for your refill to be completed.          Additional Information About Your Visit        MyChart Information     BalaBit gives you secure access to your electronic health record. If you see a primary care provider, you can also send messages to your care team and make appointments. If you have questions, please call your primary care clinic.  If you do not have a primary care provider, please call [phone] and they will assist you.        Care EveryWhere ID     This is your Care EveryWhere ID. This could be used by other organizations to access your [facility] medical records  [id number]        Your Vitals  Were     Pulse Pulse Oximetry                64 95%           Blood Pressure from Last 3 Encounters:   10/24/17 126/76   10/21/17 126/76   10/10/17 128/60    Weight from Last 3 Encounters:   10/10/17 207 lb 9.6 oz (94.2 kg)   12/09/16 208 lb 5 oz (94.5 kg)   01/06/16 212 lb 3 oz (96.2 kg)               Primary Care Provider Office Phone # Fax #    Jocelyne Boyer [phone] [address] [place] [address]        Equal Access to Services     [facility]: Hadii aad ku hadasho Soomaali, waaxda luqadaha, qaybta kaalmada adeegyada, fam stewart hayhiram carroin . So [facility] [phone].    ATENCIÓN: Si habla español, tiene a dickey disposición servicios gratuitos de asistencia lingüística. [facility] [phone].    We comply with applicable federal civil rights laws and [place] laws. We do not discriminate on the basis of race, color, national origin, age, disability, sex, sexual orientation, or gender identity.            Thank you!     [facility]. Our goal is always to provide you with excellent care. Hearing back from our patients is one way we can continue to improve our services. Please take a few minutes to complete the written survey that you may receive in the mail after your visit with us. Thank you!             Your Updated Medication List - Protect others around you: Learn how to safely use, store and throw away your medicines at www.disposemymeds.org.          This list is accurate as of: 10/24/17  3:02 PM.  Always use your most recent med list.                   Brand Name Dispense Instructions for use Diagnosis    aspirin 81 MG tablet     30    Take by mouth daily.        atenolol 50 MG tablet    TENORMIN    30 tablet    TAKE ONE TABLET BY MOUTH EVERY DAY    Benign hypertension with CKD (chronic kidney disease) stage III       benzonatate 200 MG capsule    TESSALON    21 capsule    Take 1 capsule (200  mg) by mouth 3 times daily as needed for cough    Cough       celecoxib 200 MG capsule    celeBREX    90 capsule    Take 1 capsule (200 mg) by mouth daily as needed for moderate pain    Osteoarthritis, unspecified osteoarthritis type, unspecified site       CVS vitamin  B12 1000 MCG Tabs   Generic drug:  cyanocobalamin      1 tablet daily        doxycycline 100 MG capsule    VIBRAMYCIN    20 capsule    Take 1 capsule (100 mg) by mouth 2 times daily    Cough       fenofibrate 54 MG tablet     90 tablet    Take 1 tablet (54 mg) by mouth daily    Hyperlipidemia LDL goal <130       finasteride 5 MG tablet    PROSCAR    90 tablet    TAKE ONE TABLET BY MOUTH ONCE DAILY    Benign non-nodular prostatic hyperplasia without lower urinary tract symptoms       lisinopril-hydrochlorothiazide 20-12.5 MG per tablet    PRINZIDE/ZESTORETIC    180 tablet    Take 2 tablets by mouth daily    Benign hypertension with CKD (chronic kidney disease) stage III       MUCINEX PO           multivitamin, therapeutic with minerals Tabs tablet      Take 1 tablet by mouth daily        naproxen sodium 220 MG tablet    ANAPROX    180 tablet    Take 1 tablet (220 mg) by mouth 2 times daily (with meals)    Primary osteoarthritis involving multiple joints       omeprazole 40 MG capsule    priLOSEC    90 capsule    Take 1 capsule (40 mg) by mouth daily    Gastroesophageal reflux disease without esophagitis       rosuvastatin 40 MG tablet    CRESTOR    90 tablet    Take 1 tablet (40 mg) by mouth daily    Hyperlipidemia LDL goal <130       tamsulosin 0.4 MG capsule    FLOMAX    90 capsule    Take 1 capsule (0.4 mg) by mouth daily    Benign non-nodular prostatic hyperplasia without lower urinary tract symptoms       VITAMIN C PO           ZINC PO

## 2017-10-24 NOTE — LETTER
10/24/2017      RE: Rk Aldana  [address] [address]       DERMATOLOGY CLINIC VISIT NOTE      CHIEF COMPLAINT:  Scaling spots on head.      DERMATOLOGY PROBLEM LIST:   1.  Actinic keratoses.   2.  Seborrheic keratoses.      HISTORY OF PRESENT ILLNESS:  Mr. Aldana is an 82-year-old male seen for follow up of aks. Seen 2 months ago and liquid nitrogen applied. Due to the thickness of a lesion on the L scalp I asked that he come back to have a recheck of the area. Notes ongoing scaling at the site. No bleeding areas. Has scattered bumps that are not symptomatic on the back and face.      Patient Active Problem List   Diagnosis     BILATERAL CAROTID BRUITS     B-complex deficiency     BPH (benign prostatic hyperplasia)     Diverticulosis of colon with hemorrhage     Hyperlipidemia with target LDL less than 130     Advanced directives, counseling/discussion     CAD (coronary artery disease)     Hx of adenomatous colonic polyps     Prediabetes     Back pain     GERD (gastroesophageal reflux disease)     CKD (chronic kidney disease) stage 3, GFR 30-59 ml/min     Benign hypertension with CKD (chronic kidney disease) stage III     Obesity (BMI 30-39.9)     Left medial knee pain     Obesity     AK (actinic keratosis)     Seborrheic keratosis       Allergies   Allergen Reactions     Penicillins      Pen-     Seasonal Allergies          Current Outpatient Prescriptions   Medication     Multiple Vitamins-Minerals (ZINC PO)     Ascorbic Acid (VITAMIN C PO)     GuaiFENesin (MUCINEX PO)     benzonatate (TESSALON) 200 MG capsule     doxycycline (VIBRAMYCIN) 100 MG capsule     atenolol (TENORMIN) 50 MG tablet     finasteride (PROSCAR) 5 MG tablet     rosuvastatin (CRESTOR) 40 MG tablet     fenofibrate 54 MG tablet     celecoxib (CELEBREX) 200 MG capsule     tamsulosin (FLOMAX) 0.4 MG capsule     lisinopril-hydrochlorothiazide (PRINZIDE/ZESTORETIC) 20-12.5 MG per tablet     omeprazole (PRILOSEC) 40 MG capsule      naproxen sodium (ANAPROX) 220 MG tablet     multivitamin, therapeutic with minerals (THERA-VIT-M) TABS     CVS VITAMIN B12 1000 MCG OR TABS     aspirin 81 MG tablet     No current facility-administered medications for this visit.           REVIEW OF SYSTEMS:  Feels well without additional skin concerns today.      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      PHYSICAL EXAMINATION:   There were no vitals taken for this visit.    GENERAL:  The patient is a healthy-appearing 82-year-old male in no distress.   HEENT:  Conjunctivae are clear.   PULMONARY:  Breathing comfortably on room air.   ABDOMEN:  No abdominal distention.   SKIN:  Exam is focused on the scalp, face, back, chest, and neck.  Skin exam is normal except for as follows:   -- Examination of the vertex scalp shows scattered gritty pink papules.   -- L lower cheek with scaly papule  -- Right nasal tip with gritty pink papule.   -- Left frontal scalp with gritty papule   -- Scattered medium and dark brown, waxy papules on the bilateral central cheeks, upper and lower back, upper chest, abdomen     ASSESSMENT AND PLAN:   1.  Actinic keratoses.  A total of 6 lesions treated with 10-second freeze/thaw cycle with liquid nitrogen.  Wound care instruction provided.  Lesion on the L scalp is very flat without induration today.     2.  Seborrheic keratoses, benign hyperkeratotic papules.  No treatment advised.      The patient to return in 12 months' time for reevaluation, sooner prn.           Lupe Hyatt MD  Dermatology Staff       cc:   Jocelyne Boyer MD   [facility] [address] [place]  [address]         D: 2017 16:22   T: 2017 07:40   MT: andrea      Name:     BILLY SHELLEY   MRN:      [id number]        Account:      [id number]   :      1935           Service Date: 2017      Document: [id number]

## 2017-10-24 NOTE — MR AVS SNAPSHOT
After Visit Summary   10/24/2017    Rk Aldana    MRN: [id number]           Patient Information     Date Of Birth          1935        Visit Information        Provider Department      10/24/2017 1:15 PM Lupe Hyatt MD [facility]        Today's Diagnoses     Seborrheic keratosis    -  1    AK (actinic keratosis)           Follow-ups after your visit        Your next 10 appointments already scheduled     Oct 24, 2017  2:40 PM CDT   Office Visit with Jocelyne Boyer MD   [facility] ([facility])    [address] [place] [address]  [address]  [facility] [address]   [phone]           Bring a current list of meds and any records pertaining to this visit. For Physicals, please bring immunization records and any forms needing to be filled out. Please arrive 10 minutes early to complete paperwork.              Who to contact     If you have questions or need follow up information about today's clinic visit or your schedule please contact [facility] directly at [phone].  Normal or non-critical lab and imaging results will be communicated to you by CloudOnhart, letter or phone within 4 business days after the clinic has received the results. If you do not hear from us within 7 days, please contact the clinic through [facility] [employer] or phone. If you have a critical or abnormal lab result, we will notify you by phone as soon as possible.  Submit refill requests through [employer] or call your pharmacy and they will forward the refill request to us. Please allow 3 business days for your refill to be completed.          Additional Information About Your Visit        CloudOnhart Information     [employer] gives you secure access to your electronic health record. If you see a primary care provider, you can also send messages to your care team and make appointments. If you have questions, please call your primary care clinic.  If you do not have a primary  care provider, please call [phone] and they will assist you.        Care EveryWhere ID     This is your Care EveryWhere ID. This could be used by other organizations to access your [place] medical records  [id number]         Blood Pressure from Last 3 Encounters:   10/21/17 126/76   10/10/17 128/60   12/09/16 138/66    Weight from Last 3 Encounters:   10/10/17 207 lb 9.6 oz (94.2 kg)   12/09/16 208 lb 5 oz (94.5 kg)   01/06/16 212 lb 3 oz (96.2 kg)              We Performed the Following     DESTRUCT PREMALIGNANT LESION, 2-14     DESTRUCT PREMALIGNANT LESION, FIRST        Primary Care Provider Office Phone # Fax #    Jocelyne Boyer [phone] [address] [place] [address]        Equal Access to Services     [facility]: Hadii aad ku hadasho Soomaali, waaxda luqadaha, qaybta kaalmada adeegyada, waxay jennain hayindigon genesis carrion . So [facility] [phone].    ATENCIÓN: Si habla español, tiene a dickey disposición servicios gratuitos de asistencia lingüística. Llame al [phone].    We comply with applicable federal civil rights laws and [place] laws. We do not discriminate on the basis of race, color, national origin, age, disability, sex, sexual orientation, or gender identity.            Thank you!     [facility] you [facility]  for your care. Our goal is always to provide you with excellent care. Hearing back from our patients is one way we can continue to improve our services. Please take a few minutes to complete the written survey that you may receive in the mail after your visit with us. Thank you!             Your Updated Medication List - Protect others around you: Learn how to safely use, store and throw away your medicines at www.disposemymeds.org.          This list is accurate as of: 10/24/17  2:01 PM.  Always use your most recent med list.                   Brand Name Dispense Instructions for use Diagnosis    aspirin 81  MG tablet     30    Take by mouth daily.        atenolol 50 MG tablet    TENORMIN    30 tablet    TAKE ONE TABLET BY MOUTH EVERY DAY    Benign hypertension with CKD (chronic kidney disease) stage III       benzonatate 200 MG capsule    TESSALON    21 capsule    Take 1 capsule (200 mg) by mouth 3 times daily as needed for cough    Cough       celecoxib 200 MG capsule    celeBREX    90 capsule    Take 1 capsule (200 mg) by mouth daily as needed for moderate pain    Osteoarthritis, unspecified osteoarthritis type, unspecified site       [employer] vitamin  B12 1000 MCG Tabs   Generic drug:  cyanocobalamin      1 tablet daily        doxycycline 100 MG capsule    VIBRAMYCIN    20 capsule    Take 1 capsule (100 mg) by mouth 2 times daily    Cough       fenofibrate 54 MG tablet     90 tablet    Take 1 tablet (54 mg) by mouth daily    Hyperlipidemia LDL goal <130       finasteride 5 MG tablet    PROSCAR    90 tablet    TAKE ONE TABLET BY MOUTH ONCE DAILY    Benign non-nodular prostatic hyperplasia without lower urinary tract symptoms       lisinopril-hydrochlorothiazide 20-12.5 MG per tablet    PRINZIDE/ZESTORETIC    180 tablet    Take 2 tablets by mouth daily    Benign hypertension with CKD (chronic kidney disease) stage III       MUCINEX PO           multivitamin, therapeutic with minerals Tabs tablet      Take 1 tablet by mouth daily        naproxen sodium 220 MG tablet    ANAPROX    180 tablet    Take 1 tablet (220 mg) by mouth 2 times daily (with meals)    Primary osteoarthritis involving multiple joints       omeprazole 40 MG capsule    priLOSEC    90 capsule    Take 1 capsule (40 mg) by mouth daily    Gastroesophageal reflux disease without esophagitis       rosuvastatin 40 MG tablet    CRESTOR    90 tablet    Take 1 tablet (40 mg) by mouth daily    Hyperlipidemia LDL goal <130       tamsulosin 0.4 MG capsule    FLOMAX    90 capsule    Take 1 capsule (0.4 mg) by mouth daily    Benign non-nodular prostatic hyperplasia  without lower urinary tract symptoms       VITAMIN C PO           ZINC PO

## 2017-10-24 NOTE — NURSING NOTE
"Chief Complaint   Patient presents with     RECHECK     2 month f/u -healed pretty well and facial and head check        Initial There were no vitals taken for this visit. Estimated body mass index is 32.76 kg/(m^2) as calculated from the following:    Height as of 10/10/17: 5' 6.75\" (169.5 cm).    Weight as of 10/10/17: 207 lb 9.6 oz (94.2 kg).  Medication Reconciliation: complete   Bushra Galvez MA      "

## 2017-10-24 NOTE — PROGRESS NOTES
"  SUBJECTIVE:   Rk Aldana is a 82 year old male who presents to clinic today for the following health issues:    On 10/21 patient was evaluated by UC for the complaint of a cough. A chest XR was preformed without indication of pneumonia. He was started on Doxycycline and tessalon pearls.     Since then he reports he has been the same. States he continues to have a cough and is still feeling \"punk\" and fatigued. Patient gets warmth in his chest when he coughs. His wife is in the hospital with a similar illness and he wants to get better. Reports a difference in mobility since using a cane. He kept his appointment today so he could talk about his wife.     He is wondering why his wife was released from the hospital since her oxygen need continued to increase.They discharged her to [facility]  and he is unsure as to if she is in the right place since she has developed new sx. Reports she is unable to swallow or eat on her own. Her potassium is low and she does not eliminate a lot. He last talked to the nurse practitioner yesterday.     RESPIRATORY SYMPTOMS      Duration: 3 weeks     Description  cough    Severity: moderate    Accompanying signs and symptoms: None    History (predisposing factors):  none    Precipitating or alleviating factors: None    Therapies tried and outcome:  rest and fluids and azithromycin, tessalon working well.  Feels like he is getting better     Problem list and histories reviewed & adjusted, as indicated.  Additional history: as documented    Patient Active Problem List   Diagnosis     BILATERAL CAROTID BRUITS     B-complex deficiency     BPH (benign prostatic hyperplasia)     Diverticulosis of colon with hemorrhage     Hyperlipidemia with target LDL less than 130     Advanced directives, counseling/discussion     CAD (coronary artery disease)     Hx of adenomatous colonic polyps     Prediabetes     Back pain     GERD (gastroesophageal reflux disease)     CKD (chronic kidney disease) " stage 3, GFR 30-59 ml/min     Benign hypertension with CKD (chronic kidney disease) stage III     Obesity (BMI 30-39.9)     Left medial knee pain     Obesity     AK (actinic keratosis)     Seborrheic keratosis     Past Surgical History:   Procedure Laterality Date     SURGICAL HISTORY OF -   03/27/00    Colonoscopy       Social History   Substance Use Topics     Smoking status: Never Smoker     Smokeless tobacco: Never Used     Alcohol use Yes      Comment: rarely     Family History   Problem Relation Age of Onset     Connective Tissue Disorder Mother      LUPUS     Cancer - colorectal Father      70         Current Outpatient Prescriptions   Medication Sig Dispense Refill     Multiple Vitamins-Minerals (ZINC PO)        Ascorbic Acid (VITAMIN C PO)        GuaiFENesin (MUCINEX PO)        benzonatate (TESSALON) 200 MG capsule Take 1 capsule (200 mg) by mouth 3 times daily as needed for cough 21 capsule 0     doxycycline (VIBRAMYCIN) 100 MG capsule Take 1 capsule (100 mg) by mouth 2 times daily 20 capsule 0     atenolol (TENORMIN) 50 MG tablet TAKE ONE TABLET BY MOUTH EVERY DAY 30 tablet 0     finasteride (PROSCAR) 5 MG tablet TAKE ONE TABLET BY MOUTH ONCE DAILY 90 tablet 2     rosuvastatin (CRESTOR) 40 MG tablet Take 1 tablet (40 mg) by mouth daily 90 tablet 3     fenofibrate 54 MG tablet Take 1 tablet (54 mg) by mouth daily 90 tablet 3     celecoxib (CELEBREX) 200 MG capsule Take 1 capsule (200 mg) by mouth daily as needed for moderate pain 90 capsule 3     tamsulosin (FLOMAX) 0.4 MG capsule Take 1 capsule (0.4 mg) by mouth daily 90 capsule 3     lisinopril-hydrochlorothiazide (PRINZIDE/ZESTORETIC) 20-12.5 MG per tablet Take 2 tablets by mouth daily 180 tablet 3     omeprazole (PRILOSEC) 40 MG capsule Take 1 capsule (40 mg) by mouth daily 90 capsule 3     naproxen sodium (ANAPROX) 220 MG tablet Take 1 tablet (220 mg) by mouth 2 times daily (with meals) 180 tablet 3     multivitamin, therapeutic with minerals  (THERA-VIT-M) TABS Take 1 tablet by mouth daily       CVS VITAMIN B12 1000 MCG OR TABS 1 tablet daily       aspirin 81 MG tablet Take by mouth daily.  30 0     Allergies   Allergen Reactions     Penicillins      Pen-     Seasonal Allergies      BP Readings from Last 3 Encounters:   10/24/17 126/76   10/21/17 126/76   10/10/17 128/60    Wt Readings from Last 3 Encounters:   10/10/17 94.2 kg (207 lb 9.6 oz)   12/09/16 94.5 kg (208 lb 5 oz)   01/06/16 96.2 kg (212 lb 3 oz)                        Reviewed and updated as needed this visit by clinical staffTobacco  Meds  Med Hx  Surg Hx  Fam Hx  Soc Hx      Reviewed and updated as needed this visit by Provider         ROS:  Constitutional, HEENT, cardiovascular, pulmonary, gi and gu systems are negative, except as otherwise noted.    GENERAL POSITIVE for fatigue   RESP POSITIVE for cough     This document serves as a record of the services and decisions personally performed and made by Jocelyne Boyer MD. It was created on her behalf by Sri Jenkins, a trained medical scribe. The creation of this document is based the provider's statements to the medical scribe.    Sri Jenkins October 24, 2017 2:17 PM  OBJECTIVE:   /76  Pulse 64  SpO2 95%  There is no height or weight on file to calculate BMI.  GENERAL: healthy, alert and no distress  RESP: lungs clear to auscultation - no rales, rhonchi or wheezes  CV: regular rate and rhythm, normal S1 S2, no S3 or S4, no murmur, click or rub  PSYCH: mentation appears normal, affect normal/bright    Diagnostic Test Results:  none     ASSESSMENT/PLAN:   I spent 30 min face to face with patient over 50% in counseling on issues as detailed below.        (R05) Cough  (primary encounter diagnosis)  -- patient was seen by UC; chest XR NEG   -- advised patient to continue on doxycycline and progression of illness   -- if sx do not improve advised to come back into clinic   Plan: CANCELED: XR Chest 2 Views           Follow  up if symptoms are not improving or worsening.     The information in this document, created by the medical scribe for me, accurately reflects the services I personally performed and the decisions made by me. I have reviewed and approved this document for accuracy prior to leaving the patient care area.  Jocelyne Boyer MD  [facility]

## 2017-10-24 NOTE — NURSING NOTE
"No chief complaint on file.      Initial /76  Pulse 64  SpO2 95% Estimated body mass index is 32.76 kg/(m^2) as calculated from the following:    Height as of 10/10/17: 5' 6.75\" (1.695 m).    Weight as of 10/10/17: 207 lb 9.6 oz (94.2 kg).  Medication Reconciliation: complete     Martha Kaminski      "

## 2017-10-24 NOTE — PATIENT INSTRUCTIONS
If you feel like there is an acute change or she is worsening then you can ask the nurses to have her go back into the emergency room.     Ask to meet with the nurse practitioner and the care team meeting to see what they think about hospice or other care.

## 2017-11-16 DIAGNOSIS — N40.0 BENIGN NON-NODULAR PROSTATIC HYPERPLASIA WITHOUT LOWER URINARY TRACT SYMPTOMS: ICD-10-CM

## 2017-11-21 RX ORDER — FINASTERIDE 5 MG/1
TABLET, FILM COATED ORAL
Qty: 90 TABLET | Refills: 3 | Status: SHIPPED | OUTPATIENT
Start: 2017-11-21 | End: 2018-11-29

## 2017-11-21 NOTE — TELEPHONE ENCOUNTER
Prescription approved per [facility] Refill Protocol.  Dafne San RN

## 2017-12-21 ENCOUNTER — TELEPHONE (OUTPATIENT)
Dept: PEDIATRICS | Facility: CLINIC | Age: 82
End: 2017-12-21

## 2017-12-21 DIAGNOSIS — E78.5 HYPERLIPIDEMIA LDL GOAL <130: ICD-10-CM

## 2017-12-21 DIAGNOSIS — N18.30 BENIGN HYPERTENSION WITH CKD (CHRONIC KIDNEY DISEASE) STAGE III (H): Primary | ICD-10-CM

## 2017-12-21 DIAGNOSIS — I12.9 BENIGN HYPERTENSION WITH CKD (CHRONIC KIDNEY DISEASE) STAGE III (H): Primary | ICD-10-CM

## 2017-12-21 NOTE — TELEPHONE ENCOUNTER
Requested Prescriptions   Pending Prescriptions Disp Refills     fenofibrate 54 MG tablet [Pharmacy Med Name: FENOFIBRATE 54MG    TAB]    Last Written Prescription Date:  12/13/2016  Last Fill Quantity: 90 tablet,  # refills: 3   Last Office Visit with [facility], [facility] or [facility] prescribing provider:  10/24/2017   Future Office Visit:      90 tablet 3     Sig: TAKE ONE TABLET BY MOUTH ONCE DAILY    Fibrates Failed    12/21/2017  5:30 AM       Failed - Lipid panel on file in past 12 months    Recent Labs   Lab Test  12/14/16   0721   01/12/14   0714   CHOL  123   < >  110   TRIG  215*   < >  231*   HDL  46   < >  37*   LDL  34   < >  27   VLDL   --    --   46*   CHOLHDLRATIO   --    --   3.0    < > = values in this interval not displayed.              Passed - No abnormal creatine kinase in past 12 months    No lab results found.         Passed - Recent or future visit with authorizing provider    Patient had office visit in the last year or has a visit in the next 30 days with authorizing provider.  See chart review.              Passed - Patient is age 18 or older        atenolol (TENORMIN) 100 MG tablet [Pharmacy Med Name: ATENOLOL 100MG      TAB]    Last Written Prescription Date:  9/29/2017  Last Fill Quantity: 30 tablet,  # refills: 0   Last Office Visit with [facility], [facility] or [facility] prescribing provider:  10/24/2017   Future Office Visit:      15 tablet 5     Sig: TAKE ONE-HALF TABLET BY MOUTH ONCE DAILY    Beta-Blockers Protocol Passed    12/21/2017  5:30 AM       Passed - Blood pressure under 140/90    BP Readings from Last 3 Encounters:   10/24/17 126/76   10/21/17 126/76   10/10/17 128/60                Passed - Patient is age 6 or older       Passed - Recent or future visit with authorizing provider's specialty    Patient had office visit in the last year or has a visit in the next 30 days with authorizing provider.  See chart review.

## 2017-12-26 DIAGNOSIS — I12.9 BENIGN HYPERTENSION WITH CKD (CHRONIC KIDNEY DISEASE) STAGE III (H): ICD-10-CM

## 2017-12-26 DIAGNOSIS — N18.30 BENIGN HYPERTENSION WITH CKD (CHRONIC KIDNEY DISEASE) STAGE III (H): ICD-10-CM

## 2017-12-26 RX ORDER — ATENOLOL 50 MG/1
50 TABLET ORAL DAILY
Qty: 30 TABLET | Refills: 0 | Status: CANCELLED | OUTPATIENT
Start: 2017-12-26

## 2017-12-26 NOTE — TELEPHONE ENCOUNTER
Medication Detail      Disp Refills Start End AUGUST   atenolol (TENORMIN) 50 MG tablet 30 tablet 0 9/29/2017  No

## 2017-12-27 RX ORDER — FENOFIBRATE 54 MG/1
TABLET ORAL
Qty: 90 TABLET | Refills: 0 | Status: SHIPPED | OUTPATIENT
Start: 2017-12-27 | End: 2018-04-09

## 2017-12-27 RX ORDER — ATENOLOL 100 MG/1
TABLET ORAL
Qty: 45 TABLET | Refills: 0 | Status: SHIPPED | OUTPATIENT
Start: 2017-12-27 | End: 2018-04-09

## 2017-12-27 NOTE — TELEPHONE ENCOUNTER
Duplicate.     Requested Prescriptions   Pending Prescriptions Disp Refills     atenolol (TENORMIN) 50 MG tablet    Last Written Prescription Date:  12/27/2017  Last Fill Quantity: 45,  # refills: 0   Last Office Visit with [facility] or [facility] prescribing provider:  10/24/2017   Future Office Visit:      30 tablet 0     Sig: Take 1 tablet (50 mg) by mouth daily    Beta-Blockers Protocol Passed    12/26/2017  4:49 PM       Passed - Blood pressure under 140/90    BP Readings from Last 3 Encounters:   10/24/17 126/76   10/21/17 126/76   10/10/17 128/60                Passed - Patient is age 6 or older       Passed - Recent or future visit with authorizing provider's specialty    Patient had office visit in the last year or has a visit in the next 30 days with authorizing provider.  See chart review.

## 2018-01-12 DIAGNOSIS — M19.90 OSTEOARTHRITIS, UNSPECIFIED OSTEOARTHRITIS TYPE, UNSPECIFIED SITE: ICD-10-CM

## 2018-01-12 DIAGNOSIS — N18.30 BENIGN HYPERTENSION WITH CKD (CHRONIC KIDNEY DISEASE) STAGE III (H): ICD-10-CM

## 2018-01-12 DIAGNOSIS — I12.9 BENIGN HYPERTENSION WITH CKD (CHRONIC KIDNEY DISEASE) STAGE III (H): ICD-10-CM

## 2018-01-12 NOTE — TELEPHONE ENCOUNTER
"Requested Prescriptions   Pending Prescriptions Disp Refills     lisinopril-hydrochlorothiazide (PRINZIDE/ZESTORETIC) 20-12.5 MG per tablet [Pharmacy Med Name: LISINOPRIL/HCTZ 20-12.5MG TAB]  Last Written Prescription Date:  12/13/16  Last Fill Quantity: 180,  # refills: 3   Last Office Visit with [facility], [facility] or [facility] prescribing provider:  10/24/2017     Future Office Visit:      180 tablet 3     Sig: TAKE TWO TABLETS BY MOUTH ONCE DAILY    Diuretics (Including Combos) Protocol Failed    1/12/2018 11:49 AM       Failed - Normal serum creatinine on file in past 12 months    Recent Labs   Lab Test  12/14/16   0721   CR  1.21             Failed - Normal serum potassium on file in past 12 months    Recent Labs   Lab Test  12/14/16   0721   POTASSIUM  4.0             Failed - Normal serum sodium on file in past 12 months    Recent Labs   Lab Test  12/14/16   0721   NA  140             Passed - Blood pressure under 140/90    BP Readings from Last 3 Encounters:   10/24/17 126/76   10/21/17 126/76   10/10/17 128/60            Passed - Recent or future visit with authorizing provider's specialty    Patient had office visit in the last year or has a visit in the next 30 days with authorizing provider.  See \"Patient Info\" tab in inbasket, or \"Choose Columns\" in Meds & Orders section of the refill encounter.          Passed - Patient is age 18 or older                  celecoxib (CELEBREX) 200 MG capsule [Pharmacy Med Name: CELECOXIB 200MG CAP]  Last Written Prescription Date:  12/13/16  Last Fill Quantity: 90,  # refills: 3   Last Office Visit with [facility], [facility] or [facility] prescribing provider:  10/24/2017     Future Office Visit:      90 capsule 3     Sig: TAKE ONE CAPSULE BY MOUTH ONCE DAILY AS NEEDED FOR MODERATE PAIN    NSAID Medications Failed    1/12/2018 11:49 AM       Failed - Normal ALT on file in past 12 months    Recent Labs   Lab Test  12/14/16   0721   ALT  42            Failed - Normal AST on file in past 12 months " "   Recent Labs   Lab Test  12/14/16   0721   AST  25            Failed - Patient is age 6-64 years       Failed - Normal CBC on file in past 12 months    Recent Labs   Lab Test  10/21/17   1018   WBC  5.8   RBC  4.27*   HGB  13.3   HCT  40.0   PLT  222            Failed - Normal serum creatinine on file in past 12 months    Recent Labs   Lab Test  12/14/16   0721   CR  1.21            Passed - Blood pressure under 140/90    BP Readings from Last 3 Encounters:   10/24/17 126/76   10/21/17 126/76   10/10/17 128/60                Passed - Recent or future visit with authorizing provider's specialty    Patient had office visit in the last year or has a visit in the next 30 days with authorizing provider.  See \"Patient Info\" tab in inbasket, or \"Choose Columns\" in Meds & Orders section of the refill encounter.                 "

## 2018-01-12 NOTE — LETTER
[address]  [place] [address]  [phone]      March 1, 2018    Rk Aldana                                                                                                                                                       [address] [facility] [address]              Dear Rk,    According to our records you are due for an annual physical and fasting labs. Please contact our office to set up this appointment.    Thank you    Sincerely,  Jocelyne Boyer MD

## 2018-01-17 ENCOUNTER — TELEPHONE (OUTPATIENT)
Dept: PEDIATRICS | Facility: CLINIC | Age: 83
End: 2018-01-17

## 2018-01-17 RX ORDER — LISINOPRIL AND HYDROCHLOROTHIAZIDE 12.5; 2 MG/1; MG/1
TABLET ORAL
Qty: 180 TABLET | Refills: 0 | Status: SHIPPED | OUTPATIENT
Start: 2018-01-17 | End: 2018-05-02

## 2018-01-17 RX ORDER — CELECOXIB 200 MG/1
CAPSULE ORAL
Qty: 90 CAPSULE | Refills: 0 | Status: SHIPPED | OUTPATIENT
Start: 2018-01-17 | End: 2018-04-25

## 2018-01-17 NOTE — TELEPHONE ENCOUNTER
Patient is due for annual labs and a physical.  Refilled x 1.  Please call patient to help schedule an appointment.  Soco Painting, RN  Triage Nurse

## 2018-01-18 NOTE — TELEPHONE ENCOUNTER
It looks like he has 90 day supplies.  It is ok as long as he comes in in early march.   Jocelyne Boyer MD

## 2018-02-09 DIAGNOSIS — E78.5 HYPERLIPIDEMIA LDL GOAL <130: ICD-10-CM

## 2018-02-09 RX ORDER — ROSUVASTATIN CALCIUM 40 MG/1
TABLET, COATED ORAL
Qty: 30 TABLET | Refills: 0 | Status: SHIPPED | OUTPATIENT
Start: 2018-02-09 | End: 2018-03-22

## 2018-02-09 NOTE — TELEPHONE ENCOUNTER
"Requested Prescriptions   Pending Prescriptions Disp Refills     rosuvastatin (CRESTOR) 40 MG tablet [Pharmacy Med Name: ROSUVASTATIN 40MG TAB]    Last Written Prescription Date:  1/26/2017  Last Fill Quantity: 90,  # refills: 3   Last Office Visit with [facility] provider:  10/24/2017   Future Office Visit:      90 tablet 3     Sig: TAKE ONE TABLET BY MOUTH ONCE DAILY    Statins Protocol Failed    2/9/2018 10:40 AM       Failed - LDL on file in past 12 months    Recent Labs   Lab Test  12/14/16   0721   LDL  34            Passed - No abnormal creatine kinase in past 12 months    No lab results found.         Passed - Recent or future visit with authorizing provider    Patient had office visit in the last year or has a visit in the next 30 days with authorizing provider.  See \"Patient Info\" tab in inbasket, or \"Choose Columns\" in Meds & Orders section of the refill encounter.            Passed - Patient is age 18 or older          "

## 2018-03-08 ENCOUNTER — TELEPHONE (OUTPATIENT)
Dept: PEDIATRICS | Facility: CLINIC | Age: 83
End: 2018-03-08

## 2018-03-08 DIAGNOSIS — N40.0 BENIGN NON-NODULAR PROSTATIC HYPERPLASIA WITHOUT LOWER URINARY TRACT SYMPTOMS: ICD-10-CM

## 2018-03-08 NOTE — TELEPHONE ENCOUNTER
"Requested Prescriptions   Pending Prescriptions Disp Refills     tamsulosin (FLOMAX) 0.4 MG capsule [Pharmacy Med Name: TAMSULOSIN 0.4MG    CAP]    Last Written Prescription Date:  12/13/2016  Last Fill Quantity: 90,  # refills: 3   Last office visit: 10/24/2017 with prescribing provider:  Jocelyne Boyer     Future Office Visit:     90 capsule 3     Sig: TAKE ONE CAPSULE BY MOUTH ONCE DAILY    Alpha Blockers Passed    3/8/2018 11:17 AM       Passed - Blood pressure under 140/90 in past 12 months    BP Readings from Last 3 Encounters:   10/24/17 126/76   10/21/17 126/76   10/10/17 128/60                Passed - Recent (12 mo) or future (30 days) visit within the authorizing provider's specialty    Patient had office visit in the last year or has a visit in the next 30 days with authorizing provider.  See \"Patient Info\" tab in inbasket, or \"Choose Columns\" in Meds & Orders section of the refill encounter.            Passed - Patient does not have Tadalafil, Vardenafil, or Sildenafil on their medication list       Passed - Patient is 18 years of age or older          "

## 2018-03-09 RX ORDER — TAMSULOSIN HYDROCHLORIDE 0.4 MG/1
CAPSULE ORAL
Qty: 90 CAPSULE | Refills: 0 | Status: SHIPPED | OUTPATIENT
Start: 2018-03-09 | End: 2018-03-14

## 2018-03-09 NOTE — TELEPHONE ENCOUNTER
Please assist patient with scheduling an office visit for an annual physical.    Thank you.    Fatou DELGADO RN, BSN, PHN  [facility] RN

## 2018-03-09 NOTE — TELEPHONE ENCOUNTER
Medication is being filled for 1 time refill only due to:  Patient needs to be seen because due for an annual physical.    Prescription approved per [facility] Refill Protocol.    Fatou DELGADO RN, BSN, PHN  [place] [facility] RN

## 2018-03-14 ENCOUNTER — OFFICE VISIT (OUTPATIENT)
Dept: PEDIATRICS | Facility: CLINIC | Age: 83
End: 2018-03-14
Payer: MEDICARE

## 2018-03-14 VITALS
WEIGHT: 208.4 LBS | BODY MASS INDEX: 32.89 KG/M2 | OXYGEN SATURATION: 97 % | DIASTOLIC BLOOD PRESSURE: 58 MMHG | HEART RATE: 61 BPM | TEMPERATURE: 97.5 F | SYSTOLIC BLOOD PRESSURE: 130 MMHG

## 2018-03-14 DIAGNOSIS — N40.0 BENIGN NON-NODULAR PROSTATIC HYPERPLASIA WITHOUT LOWER URINARY TRACT SYMPTOMS: ICD-10-CM

## 2018-03-14 DIAGNOSIS — R73.03 PREDIABETES: ICD-10-CM

## 2018-03-14 DIAGNOSIS — N18.30 CKD (CHRONIC KIDNEY DISEASE) STAGE 3, GFR 30-59 ML/MIN (H): ICD-10-CM

## 2018-03-14 DIAGNOSIS — E78.5 HYPERLIPIDEMIA WITH TARGET LDL LESS THAN 130: ICD-10-CM

## 2018-03-14 DIAGNOSIS — N18.30 BENIGN HYPERTENSION WITH CKD (CHRONIC KIDNEY DISEASE) STAGE III (H): ICD-10-CM

## 2018-03-14 DIAGNOSIS — I12.9 BENIGN HYPERTENSION WITH CKD (CHRONIC KIDNEY DISEASE) STAGE III (H): ICD-10-CM

## 2018-03-14 DIAGNOSIS — I25.10 CORONARY ARTERY DISEASE INVOLVING NATIVE HEART WITHOUT ANGINA PECTORIS, UNSPECIFIED VESSEL OR LESION TYPE: ICD-10-CM

## 2018-03-14 DIAGNOSIS — Z00.00 ROUTINE GENERAL MEDICAL EXAMINATION AT A HEALTH CARE FACILITY: Primary | ICD-10-CM

## 2018-03-14 PROCEDURE — G0439 PPPS, SUBSEQ VISIT: HCPCS | Performed by: INTERNAL MEDICINE

## 2018-03-14 PROCEDURE — 99213 OFFICE O/P EST LOW 20 MIN: CPT | Mod: 25 | Performed by: INTERNAL MEDICINE

## 2018-03-14 RX ORDER — TAMSULOSIN HYDROCHLORIDE 0.4 MG/1
0.4 CAPSULE ORAL DAILY
Qty: 90 CAPSULE | Refills: 3 | Status: SHIPPED | OUTPATIENT
Start: 2018-03-14 | End: 2018-05-15

## 2018-03-14 NOTE — MR AVS SNAPSHOT
After Visit Summary   3/14/2018    Rk Aldana    MRN: [id number]           Patient Information     Date Of Birth          1935        Visit Information        Provider Department      3/14/2018 11:00 AM Jocelyne Boyer MD [facility]        Today's Diagnoses     Routine general medical examination at a health care facility    -  1    Coronary artery disease involving native heart without angina pectoris, unspecified vessel or lesion type        Benign hypertension with CKD (chronic kidney disease) stage III        Hyperlipidemia with target LDL less than 130        CKD (chronic kidney disease) stage 3, GFR 30-59 ml/min        Benign non-nodular prostatic hyperplasia without lower urinary tract symptoms        Prediabetes          Care Instructions    If you notice the symptoms are exertional, then do not start back at the [facility] and let me know.     Use lidocaine patches on your hands that you can buy over the counter at the pharmacy.     Fasting lab appointment on March 21st at 9:10 AM.     Preventive Health Recommendations:   Male Ages 65 and over    Yearly exam:             See your health care provider every year in order to  o   Review health changes.   o   Discuss preventive care.    o   Review your medicines if your doctor has prescribed any.    Talk with your health care provider about whether you should have a test to screen for prostate cancer (PSA).    Every 3 years, have a diabetes test (fasting glucose). If you are at risk for diabetes, you should have this test more often.    Every 5 years, have a cholesterol test. Have this test more often if you are at risk for high cholesterol or heart disease.     Every 10 years, have a colonoscopy. Or, have a yearly FIT test (stool test). These exams will check for colon cancer.    Talk to with your health care provider about screening for Abdominal Aortic Aneurysm if you have a family history of AAA or have a history of  smoking.    Shots:     Get a flu shot each year.     Get a tetanus shot every 10 years.     Talk to your doctor about your pneumonia vaccines. There are now two you should receive - Pneumovax (PPSV 23) and Prevnar (PCV 13).     Talk to your doctor about a shingles vaccine.     Talk to your doctor about the hepatitis B vaccine.  Nutrition:     Eat at least 5 servings of fruits and vegetables each day.     Eat whole-grain bread, whole-wheat pasta and brown rice instead of white grains and rice.     Talk to your provider about Calcium and Vitamin D.   Lifestyle    Exercise for at least 150 minutes a week (30 minutes a day, 5 days a week). This will help you control your weight and prevent disease.     Limit alcohol to one drink per day.     No smoking.     Wear sunscreen to prevent skin cancer.     See your dentist every six months for an exam and cleaning.     See your eye doctor every 1 to 2 years to screen for conditions such as glaucoma, macular degeneration, cataracts, etc     Preventive Health Recommendations:   Male Ages 65 and over    Yearly exam:             See your health care provider every year in order to  o   Review health changes.   o   Discuss preventive care.    o   Review your medicines if your doctor has prescribed any.    Talk with your health care provider about whether you should have a test to screen for prostate cancer (PSA).    Every 3 years, have a diabetes test (fasting glucose). If you are at risk for diabetes, you should have this test more often.    Every 5 years, have a cholesterol test. Have this test more often if you are at risk for high cholesterol or heart disease.     Every 10 years, have a colonoscopy. Or, have a yearly FIT test (stool test). These exams will check for colon cancer.    Talk to with your health care provider about screening for Abdominal Aortic Aneurysm if you have a family history of AAA or have a history of smoking.    Shots:     Get a flu shot each year.     Get  a tetanus shot every 10 years.     Talk to your doctor about your pneumonia vaccines. There are now two you should receive - Pneumovax (PPSV 23) and Prevnar (PCV 13).     Talk to your doctor about a shingles vaccine.     Talk to your doctor about the hepatitis B vaccine.  Nutrition:     Eat at least 5 servings of fruits and vegetables each day.     Eat whole-grain bread, whole-wheat pasta and brown rice instead of white grains and rice.     Talk to your provider about Calcium and Vitamin D.   Lifestyle    Exercise for at least 150 minutes a week (30 minutes a day, 5 days a week). This will help you control your weight and prevent disease.     Limit alcohol to one drink per day.     No smoking.     Wear sunscreen to prevent skin cancer.     See your dentist every six months for an exam and cleaning.     See your eye doctor every 1 to 2 years to screen for conditions such as glaucoma, macular degeneration, cataracts, etc           Follow-ups after your visit        Your next 10 appointments already scheduled     Mar 21, 2018  9:10 AM CDT   [facility]    [address] [place] [id number] [phone]           Please do not eat 10-12 hours before your appointment if you are coming in fasting for labs on lipids, cholesterol, or glucose (sugar). This does not apply to pregnant women. Water, hot tea and black coffee (with nothing added) are okay. Do not drink other fluids, diet soda or chew gum.              Future tests that were ordered for you today     Open Future Orders        Priority Expected Expires Ordered    Lipid panel reflex to direct LDL Fasting Routine  5/14/2018 3/14/2018    Comprehensive metabolic panel Routine  5/14/2018 3/14/2018    Hemoglobin A1c Routine  5/14/2018 3/14/2018    Albumin Random Urine Quantitative with Creat Ratio Routine  5/14/2018 3/14/2018            Who to contact     If you have questions or need  "follow up information about today's clinic visit or your schedule please contact [facility] [place] directly at [phone].  Normal or non-critical lab and imaging results will be communicated to you by Playtoxhart, letter or phone within 4 business days after the clinic has received the results. If you do not hear from us within 7 days, please contact the clinic through [employer] or phone. If you have a critical or abnormal lab result, we will notify you by phone as soon as possible.  Submit refill requests through Winkcam or call your pharmacy and they will forward the refill request to us. Please allow 3 business days for your refill to be completed.          Additional Information About Your Visit        PlaytoxharJobConvo Information     Winkcam gives you secure access to your electronic health record. If you see a primary care provider, you can also send messages to your care team and make appointments. If you have questions, please call your primary care clinic.  If you do not have a primary care provider, please call [phone] and they will assist you.        Care EveryWhere ID     This is your Care EveryWhere ID. This could be used by other organizations to access your [place] medical records  [id number]        Your Vitals Were     Pulse Temperature Head Circumference Pulse Oximetry BMI (Body Mass Index)       61 97.5  F (36.4  C) (Oral) 66.75\" (169.5 cm) 97% 32.89 kg/m2        Blood Pressure from Last 3 Encounters:   03/14/18 130/58   10/24/17 126/76   10/21/17 126/76    Weight from Last 3 Encounters:   03/14/18 208 lb 6.4 oz (94.5 kg)   10/10/17 207 lb 9.6 oz (94.2 kg)   12/09/16 208 lb 5 oz (94.5 kg)                 Today's Medication Changes          These changes are accurate as of 3/14/18 12:26 PM.  If you have any questions, ask your nurse or doctor.               These medicines have changed or have updated prescriptions.        Dose/Directions    tamsulosin 0.4 MG capsule   Commonly known as:  FLOMAX "   This may have changed:  See the new instructions.   Used for:  Benign non-nodular prostatic hyperplasia without lower urinary tract symptoms   Changed by:  Jocelyne Boyer MD        Dose:  0.4 mg   Take 1 capsule (0.4 mg) by mouth daily   Quantity:  90 capsule   Refills:  3            Where to get your medicines      These medications were sent to [id number]  [place], [place] - [address]  [address], [place] [address]     Phone:  [phone]     tamsulosin 0.4 MG capsule                Primary Care Provider Office Phone # Fax #    Jocelyne Boyer [phone]       [facility] [place] [address]        Equal Access to Services     [facility]: Hadii carlos graves hadasho Sokate, waaxda luqadaha, qaybta kaalmada adeegyada, fam carrion . So [facility] [phone].    ATENCIÓN: Si habla español, tiene a dickey disposición servicios gratuitos de asistencia lingüística. [facility] [phone].    We comply with applicable federal civil rights laws and [place] laws. We do not discriminate on the basis of race, color, national origin, age, disability, sex, sexual orientation, or gender identity.            Thank you!     Thank you for choosing [facility]  for your care. Our goal is always to provide you with excellent care. Hearing back from our patients is one way we can continue to improve our services. Please take a few minutes to complete the written survey that you may receive in the mail after your visit with us. Thank you!             Your Updated Medication List - Protect others around you: Learn how to safely use, store and throw away your medicines at www.disposemymeds.org.          This list is accurate as of 3/14/18 12:26 PM.  Always use your most recent med list.                   Brand Name Dispense Instructions for use Diagnosis    aspirin 81 MG tablet     30    Take by mouth daily.        atenolol 100  MG tablet    TENORMIN    45 tablet    TAKE ONE-HALF TABLET BY MOUTH ONCE DAILY    Benign hypertension with CKD (chronic kidney disease) stage III       celecoxib 200 MG capsule    celeBREX    90 capsule    TAKE ONE CAPSULE BY MOUTH ONCE DAILY AS NEEDED FOR MODERATE PAIN    Osteoarthritis, unspecified osteoarthritis type, unspecified site       [facility] vitamin  B12 1000 MCG Tabs   Generic drug:  cyanocobalamin      1 tablet daily        fenofibrate 54 MG tablet     90 tablet    TAKE ONE TABLET BY MOUTH ONCE DAILY    Hyperlipidemia LDL goal <130       finasteride 5 MG tablet    PROSCAR    90 tablet    TAKE ONE TABLET BY MOUTH ONCE DAILY    Benign non-nodular prostatic hyperplasia without lower urinary tract symptoms       lisinopril-hydrochlorothiazide 20-12.5 MG per tablet    PRINZIDE/ZESTORETIC    180 tablet    TAKE TWO TABLETS BY MOUTH ONCE DAILY    Benign hypertension with CKD (chronic kidney disease) stage III       MUCINEX PO           multivitamin, therapeutic with minerals Tabs tablet      Take 1 tablet by mouth daily        naproxen sodium 220 MG tablet    ANAPROX    180 tablet    Take 1 tablet (220 mg) by mouth 2 times daily (with meals)    Primary osteoarthritis involving multiple joints       omeprazole 40 MG capsule    priLOSEC    90 capsule    Take 1 capsule (40 mg) by mouth daily    Gastroesophageal reflux disease without esophagitis       rosuvastatin 40 MG tablet    CRESTOR    30 tablet    TAKE ONE TABLET BY MOUTH ONCE DAILY    Hyperlipidemia LDL goal <130       tamsulosin 0.4 MG capsule    FLOMAX    90 capsule    Take 1 capsule (0.4 mg) by mouth daily    Benign non-nodular prostatic hyperplasia without lower urinary tract symptoms       VITAMIN C PO           ZINC PO

## 2018-03-14 NOTE — PROGRESS NOTES
"  SUBJECTIVE:   Rk Aldana is a 82 year old male who presents for Preventive Visit.    Rk is a patient with a complex PMHx who presents to the clinic for his annual physical examination. He endorses squeezing and heaviness in chest bilaterally that radiates over the shoulders bilaterally. Reports sx do not occur every week. Symptoms are not related to exertion, and frequently occur while sitting.  Sx come and go quickly and do not bother him if he is lying down. No known triggers, there is nothing he does to make the pain better. He states the pain is not similar to when he had prior cardiac issues. Denies shortness of breath, diaphoresis, confusion, syncope, presyncope and weakness. States he is not going to the gym in the morning since the morning nurse was let go and he has had trouble getting staffing.   Patient reports he is unable to walk far due to foot pain. He believes he is loosing sensation in the toes and balls of his feet. He is hesitant to receive steroid injections. He states his back pain is bad and he no longer lifts his wife due to pain. He notes he is unable to use certain machines at the [facility] due to pain. Cramping in legs onsets suddenly.   States his \"hands are going\" and has tried lidocaine spray and warm packs which have not helped. Complains of edema in hands bilaterally. Urinary incontinence and nocturia are the same. Reports he is up no more than 2-3x a night to urinate.     Are you in the first 12 months of your Medicare Part B coverage?  No    Healthy Habits:    Do you get at least three servings of calcium containing foods daily (dairy, green leafy vegetables, etc.)? yes    Amount of exercise or daily activities, outside of work: not as much as before     Problems taking medications regularly No    Medication side effects: No    Have you had an eye exam in the past two years? no    Do you see a dentist twice per year? no    Do you have sleep apnea, excessive snoring or daytime " drowsiness?no      Ability to successfully perform activities of daily living: Yes, no assistance needed    Home safety:  none identified     Hearing impairment: No    Fall risk:  Fallen 2 or more times in the past year?: No  Any fall with injury in the past year?: No    COGNITIVE SCREEN  1) Repeat 3 items (Banana, Sunrise, Chair)    2) Clock draw: NORMAL  3) 3 item recall: Recalls 3 objects  Results: 3 items recalled: COGNITIVE IMPAIRMENT LESS LIKELY    Mini-CogTM Copyright OLIVIA Oreilly. Licensed by the author for use in [facility]; reprinted with permission (arden@[facility]). All rights reserved.                Reviewed and updated as needed this visit by clinical staff  Tobacco  Allergies  Meds         Reviewed and updated as needed this visit by Provider        Social History   Substance Use Topics     Smoking status: Never Smoker     Smokeless tobacco: Never Used     Alcohol use Yes      Comment: rarely       If you drink alcohol do you typically have >3 drinks per day or >7 drinks per week? No                        Today's PHQ-2 Score:   PHQ-2 ( 1999 Pfizer) 3/14/2018 12/13/2016   Q1: Little interest or pleasure in doing things 0 2   Q2: Feeling down, depressed or hopeless 0 2   PHQ-2 Score 0 4       Do you feel safe in your environment - Yes    Do you have a Health Care Directive?: Yes: Advance Directive has been received and scanned.    Current providers sharing in care for this patient include:   Patient Care Team:  Jocelyne Boyer MD as PCP - General (Pediatrics)    The following health maintenance items are reviewed in Epic and correct as of today:  Health Maintenance   Topic Date Due     TETANUS IMMUNIZATION (SYSTEM ASSIGNED)  04/15/2015     ADVANCE DIRECTIVE PLANNING Q5 YRS  06/22/2017     MEDICARE ANNUAL WELLNESS VISIT  12/13/2017     FALL RISK ASSESSMENT  12/13/2017     CMP Q1 YR  12/14/2017     LIPID MONITORING Q1 YEAR  12/14/2017     MICROALBUMIN Q1 YEAR  12/14/2017     A1C Q1 YR   12/14/2017     INFLUENZA VACCINE (SYSTEM ASSIGNED)  09/01/2018     HEMOGLOBIN Q1 YR  10/21/2018     COLONOSCOPY Q5 YR  12/13/2021     PNEUMOCOCCAL  Completed     Labs reviewed in EPIC  BP Readings from Last 3 Encounters:   03/14/18 130/58   10/24/17 126/76   10/21/17 126/76    Wt Readings from Last 3 Encounters:   03/14/18 94.5 kg (208 lb 6.4 oz)   10/10/17 94.2 kg (207 lb 9.6 oz)   12/09/16 94.5 kg (208 lb 5 oz)                  Patient Active Problem List   Diagnosis     BILATERAL CAROTID BRUITS     B-complex deficiency     BPH (benign prostatic hyperplasia)     Diverticulosis of colon with hemorrhage     Hyperlipidemia with target LDL less than 130     Advanced directives, counseling/discussion     CAD (coronary artery disease)     Hx of adenomatous colonic polyps     Prediabetes     Back pain     GERD (gastroesophageal reflux disease)     CKD (chronic kidney disease) stage 3, GFR 30-59 ml/min     Benign hypertension with CKD (chronic kidney disease) stage III     Obesity (BMI 30-39.9)     Left medial knee pain     Obesity     AK (actinic keratosis)     Seborrheic keratosis     Past Surgical History:   Procedure Laterality Date     SURGICAL HISTORY OF -   03/27/00    Colonoscopy       Social History   Substance Use Topics     Smoking status: Never Smoker     Smokeless tobacco: Never Used     Alcohol use Yes      Comment: rarely     Family History   Problem Relation Age of Onset     Connective Tissue Disorder Mother      LUPUS     Cancer - colorectal Father      70         Current Outpatient Prescriptions   Medication Sig Dispense Refill     tamsulosin (FLOMAX) 0.4 MG capsule TAKE ONE CAPSULE BY MOUTH ONCE DAILY 90 capsule 0     rosuvastatin (CRESTOR) 40 MG tablet TAKE ONE TABLET BY MOUTH ONCE DAILY 30 tablet 0     lisinopril-hydrochlorothiazide (PRINZIDE/ZESTORETIC) 20-12.5 MG per tablet TAKE TWO TABLETS BY MOUTH ONCE DAILY 180 tablet 0     celecoxib (CELEBREX) 200 MG capsule TAKE ONE CAPSULE BY MOUTH ONCE DAILY  "AS NEEDED FOR MODERATE PAIN 90 capsule 0     fenofibrate 54 MG tablet TAKE ONE TABLET BY MOUTH ONCE DAILY 90 tablet 0     finasteride (PROSCAR) 5 MG tablet TAKE ONE TABLET BY MOUTH ONCE DAILY 90 tablet 3     Multiple Vitamins-Minerals (ZINC PO)        Ascorbic Acid (VITAMIN C PO)        omeprazole (PRILOSEC) 40 MG capsule Take 1 capsule (40 mg) by mouth daily 90 capsule 3     naproxen sodium (ANAPROX) 220 MG tablet Take 1 tablet (220 mg) by mouth 2 times daily (with meals) 180 tablet 3     multivitamin, therapeutic with minerals (THERA-VIT-M) TABS Take 1 tablet by mouth daily       CVS VITAMIN B12 1000 MCG OR TABS 1 tablet daily       aspirin 81 MG tablet Take by mouth daily.  30 0     atenolol (TENORMIN) 100 MG tablet TAKE ONE-HALF TABLET BY MOUTH ONCE DAILY 45 tablet 0     GuaiFENesin (MUCINEX PO)        Allergies   Allergen Reactions     Penicillins      Pen-     Seasonal Allergies            ROS:  Constitutional, HEENT, cardiovascular, pulmonary, GI, , musculoskeletal, neuro, skin, endocrine and psych systems are negative, except as otherwise noted.    This document serves as a record of the services and decisions personally performed and made by Jocelyne Boyer MD. It was created on her behalf by Sri Jenkins, a trained medical scribe. The creation of this document is based the provider's statements to the medical scribe.    Sri Jenkins March 14, 2018 11:20 AM  OBJECTIVE:   /58  Pulse 61  Temp 97.5  F (36.4  C) (Oral)  Wt 94.5 kg (208 lb 6.4 oz)  .5 cm  SpO2 97%  BMI 32.89 kg/m2 Estimated body mass index is 32.89 kg/(m^2) as calculated from the following:    Height as of 10/10/17: 1.695 m (5' 6.75\").    Weight as of this encounter: 94.5 kg (208 lb 6.4 oz).  EXAM:   GENERAL: alert and no distress  EYES: Eyes grossly normal to inspection, PERRL and conjunctivae and sclerae normal  HENT: ear canals and TM's normal, nose and mouth without ulcers or lesions  NECK: no adenopathy, no " asymmetry, masses, or scars and thyroid normal to palpation  RESP: lungs clear to auscultation - no rales, rhonchi or wheezes  CV: regular rate and rhythm, normal S1 S2, no S3 or S4, no murmur, click or rub, no peripheral edema  ABDOMEN: soft, nontender, no hepatosplenomegaly, no masses and bowel sounds normal  MS: no gross musculoskeletal defects noted, no edema  SKIN: no suspicious lesions or rashes  BACK: Cervical, thoracic and lumbar spine exam is normal without tenderness  NEURO: Normal strength and tone, mentation intact and speech normal  PSYCH: mentation appears normal, affect normal/bright    ASSESSMENT / PLAN:   (Z00.00) Routine general medical examination at a health care facility  (primary encounter diagnosis)  -- immunizations utd   -- colonoscopy utd   -- encouraged regular exercise and healthy diet   -- recommended lidocaine patches for hands     (I25.10) Coronary artery disease involving native heart without angina pectoris, unspecified vessel or lesion type  --chest pain is somewhat atypical as it is not generally exertional.  Pt refuses further work up at until summer when his daughter can assist with care for his wife  -- will make follow up appointment in June with cardiologist for further testing and workup   -- if cardiac sx occur upon exertion, advised patient to not start back at gym until further cardiac workup is completed   -on crestor, asa and atenolol    (I12.9,  N18.3) Benign hypertension with CKD (chronic kidney disease) stage III  --  On atenolol, lisinopril and hctz   -check labs to evaluate for side effects or end organ damage   Plan: Comprehensive metabolic panel, Albumin Random         Urine Quantitative with Creat Ratio      (E78.5) Hyperlipidemia with target LDL less than 130  -- fasting labs ordered and appointment scheduled   -on crestor and fenofibrate  -no muscle pain from combo noted   Plan: Lipid panel reflex to direct LDL Fasting,         Comprehensive metabolic panel    "    (N40.0) Benign non-nodular prostatic hyperplasia without lower urinary tract symptoms  -- symptoms controlled without excessive nocturia   Plan: tamsulosin (FLOMAX) 0.4 MG capsule      (R73.03) Prediabetes  Plan: Hemoglobin A1c    (N18.3) CKD (chronic kidney disease) stage 3, GFR 30-59 ml/min  Plan: Albumin Random Urine Quantitative with Creat         Ratio    Follow up 6 months     End of Life Planning:  Patient currently has an advanced directive: Yes.  Practitioner is supportive of decision.    COUNSELING:  Reviewed preventive health counseling, as reflected in patient instructions       Regular exercise       Healthy diet/nutrition      Estimated body mass index is 32.89 kg/(m^2) as calculated from the following:    Height as of 10/10/17: 1.695 m (5' 6.75\").    Weight as of this encounter: 94.5 kg (208 lb 6.4 oz).  Weight management plan: Discussed healthy diet and exercise guidelines and patient will follow up in 12 months in clinic to re-evaluate.     reports that he has never smoked. He has never used smokeless tobacco.      Appropriate preventive services were discussed with this patient, including applicable screening as appropriate for cardiovascular disease, diabetes, osteopenia/osteoporosis, and glaucoma.  As appropriate for age/gender, discussed screening for colorectal cancer, prostate cancer, breast cancer, and cervical cancer. Checklist reviewing preventive services available has been given to the patient.    Reviewed patients plan of care and provided an AVS. The Basic Care Plan (routine screening as documented in Health Maintenance) for Rk meets the Care Plan requirement. This Care Plan has been established and reviewed with the Patient.    Counseling Resources:  ATP IV Guidelines  Pooled Cohorts Equation Calculator  Breast Cancer Risk Calculator  FRAX Risk Assessment  ICSI Preventive Guidelines  Dietary Guidelines for Americans, 2010  USDA's MyPlate  ASA Prophylaxis  Lung CA " Screening    The information in this document, created by the medical scribe for me, accurately reflects the services I personally performed and the decisions made by me. I have reviewed and approved this document for accuracy prior to leaving the patient care area.  Jocelyne Boyer MD  [facility]

## 2018-03-14 NOTE — PATIENT INSTRUCTIONS
If you notice the symptoms are exertional, then do not start back at the [facility] and let me know.     Use lidocaine patches on your hands that you can buy over the counter at the pharmacy.     Fasting lab appointment on March 21st at 9:10 AM.     Preventive Health Recommendations:   Male Ages 65 and over    Yearly exam:             See your health care provider every year in order to  o   Review health changes.   o   Discuss preventive care.    o   Review your medicines if your doctor has prescribed any.    Talk with your health care provider about whether you should have a test to screen for prostate cancer (PSA).    Every 3 years, have a diabetes test (fasting glucose). If you are at risk for diabetes, you should have this test more often.    Every 5 years, have a cholesterol test. Have this test more often if you are at risk for high cholesterol or heart disease.     Every 10 years, have a colonoscopy. Or, have a yearly FIT test (stool test). These exams will check for colon cancer.    Talk to with your health care provider about screening for Abdominal Aortic Aneurysm if you have a family history of AAA or have a history of smoking.    Shots:     Get a flu shot each year.     Get a tetanus shot every 10 years.     Talk to your doctor about your pneumonia vaccines. There are now two you should receive - Pneumovax (PPSV 23) and Prevnar (PCV 13).     Talk to your doctor about a shingles vaccine.     Talk to your doctor about the hepatitis B vaccine.  Nutrition:     Eat at least 5 servings of fruits and vegetables each day.     Eat whole-grain bread, whole-wheat pasta and brown rice instead of white grains and rice.     Talk to your provider about Calcium and Vitamin D.   Lifestyle    Exercise for at least 150 minutes a week (30 minutes a day, 5 days a week). This will help you control your weight and prevent disease.     Limit alcohol to one drink per day.     No smoking.     Wear sunscreen to prevent skin cancer.      See your dentist every six months for an exam and cleaning.     See your eye doctor every 1 to 2 years to screen for conditions such as glaucoma, macular degeneration, cataracts, etc     Preventive Health Recommendations:   Male Ages 65 and over    Yearly exam:             See your health care provider every year in order to  o   Review health changes.   o   Discuss preventive care.    o   Review your medicines if your doctor has prescribed any.    Talk with your health care provider about whether you should have a test to screen for prostate cancer (PSA).    Every 3 years, have a diabetes test (fasting glucose). If you are at risk for diabetes, you should have this test more often.    Every 5 years, have a cholesterol test. Have this test more often if you are at risk for high cholesterol or heart disease.     Every 10 years, have a colonoscopy. Or, have a yearly FIT test (stool test). These exams will check for colon cancer.    Talk to with your health care provider about screening for Abdominal Aortic Aneurysm if you have a family history of AAA or have a history of smoking.    Shots:     Get a flu shot each year.     Get a tetanus shot every 10 years.     Talk to your doctor about your pneumonia vaccines. There are now two you should receive - Pneumovax (PPSV 23) and Prevnar (PCV 13).     Talk to your doctor about a shingles vaccine.     Talk to your doctor about the hepatitis B vaccine.  Nutrition:     Eat at least 5 servings of fruits and vegetables each day.     Eat whole-grain bread, whole-wheat pasta and brown rice instead of white grains and rice.     Talk to your provider about Calcium and Vitamin D.   Lifestyle    Exercise for at least 150 minutes a week (30 minutes a day, 5 days a week). This will help you control your weight and prevent disease.     Limit alcohol to one drink per day.     No smoking.     Wear sunscreen to prevent skin cancer.     See your dentist every six months for an exam and  cleaning.     See your eye doctor every 1 to 2 years to screen for conditions such as glaucoma, macular degeneration, cataracts, etc

## 2018-03-21 DIAGNOSIS — R73.03 PREDIABETES: ICD-10-CM

## 2018-03-21 DIAGNOSIS — I12.9 BENIGN HYPERTENSION WITH CKD (CHRONIC KIDNEY DISEASE) STAGE III (H): ICD-10-CM

## 2018-03-21 DIAGNOSIS — N18.30 CKD (CHRONIC KIDNEY DISEASE) STAGE 3, GFR 30-59 ML/MIN (H): ICD-10-CM

## 2018-03-21 DIAGNOSIS — N18.30 BENIGN HYPERTENSION WITH CKD (CHRONIC KIDNEY DISEASE) STAGE III (H): ICD-10-CM

## 2018-03-21 DIAGNOSIS — E78.5 HYPERLIPIDEMIA WITH TARGET LDL LESS THAN 130: ICD-10-CM

## 2018-03-21 LAB
ALBUMIN SERPL-MCNC: 4.1 G/DL (ref 3.4–5)
ALP SERPL-CCNC: 56 U/L (ref 40–150)
ALT SERPL W P-5'-P-CCNC: 34 U/L (ref 0–70)
ANION GAP SERPL CALCULATED.3IONS-SCNC: 6 MMOL/L (ref 3–14)
AST SERPL W P-5'-P-CCNC: 27 U/L (ref 0–45)
BILIRUB SERPL-MCNC: 0.4 MG/DL (ref 0.2–1.3)
BUN SERPL-MCNC: 23 MG/DL (ref 7–30)
CALCIUM SERPL-MCNC: 9.3 MG/DL (ref 8.5–10.1)
CHLORIDE SERPL-SCNC: 107 MMOL/L (ref 94–109)
CHOLEST SERPL-MCNC: 117 MG/DL
CO2 SERPL-SCNC: 29 MMOL/L (ref 20–32)
CREAT SERPL-MCNC: 1.24 MG/DL (ref 0.66–1.25)
CREAT UR-MCNC: 203 MG/DL
GFR SERPL CREATININE-BSD FRML MDRD: 56 ML/MIN/1.7M2
GLUCOSE SERPL-MCNC: 110 MG/DL (ref 70–99)
HBA1C MFR BLD: 5.8 % (ref 4.3–6)
HDLC SERPL-MCNC: 40 MG/DL
LDLC SERPL CALC-MCNC: 37 MG/DL
MICROALBUMIN UR-MCNC: 61 MG/L
MICROALBUMIN/CREAT UR: 30 MG/G CR (ref 0–17)
NONHDLC SERPL-MCNC: 77 MG/DL
POTASSIUM SERPL-SCNC: 4.3 MMOL/L (ref 3.4–5.3)
PROT SERPL-MCNC: 7.1 G/DL (ref 6.8–8.8)
SODIUM SERPL-SCNC: 142 MMOL/L (ref 133–144)
TRIGL SERPL-MCNC: 202 MG/DL

## 2018-03-21 PROCEDURE — 83036 HEMOGLOBIN GLYCOSYLATED A1C: CPT | Performed by: INTERNAL MEDICINE

## 2018-03-21 PROCEDURE — 36415 COLL VENOUS BLD VENIPUNCTURE: CPT | Performed by: INTERNAL MEDICINE

## 2018-03-21 PROCEDURE — 80053 COMPREHEN METABOLIC PANEL: CPT | Performed by: INTERNAL MEDICINE

## 2018-03-21 PROCEDURE — 82043 UR ALBUMIN QUANTITATIVE: CPT | Performed by: INTERNAL MEDICINE

## 2018-03-21 PROCEDURE — 80061 LIPID PANEL: CPT | Performed by: INTERNAL MEDICINE

## 2018-03-22 DIAGNOSIS — K21.9 GASTROESOPHAGEAL REFLUX DISEASE WITHOUT ESOPHAGITIS: ICD-10-CM

## 2018-03-22 DIAGNOSIS — E78.5 HYPERLIPIDEMIA LDL GOAL <130: ICD-10-CM

## 2018-03-22 RX ORDER — ROSUVASTATIN CALCIUM 40 MG/1
TABLET, COATED ORAL
Qty: 90 TABLET | Refills: 3 | Status: SHIPPED | OUTPATIENT
Start: 2018-03-22 | End: 2019-03-14

## 2018-03-22 RX ORDER — OMEPRAZOLE 40 MG/1
CAPSULE, DELAYED RELEASE ORAL
Qty: 90 CAPSULE | Refills: 3 | Status: SHIPPED | OUTPATIENT
Start: 2018-03-22 | End: 2019-03-14

## 2018-03-22 NOTE — TELEPHONE ENCOUNTER
"Requested Prescriptions   Pending Prescriptions Disp Refills     rosuvastatin (CRESTOR) 40 MG tablet [Pharmacy Med Name: ROSUVASTATIN 40MG TAB]    Last Written Prescription Date:  2/9/2018  Last Fill Quantity: 30,  # refills: 0   Last office visit: 3/14/2018 with prescribing provider:  Jocelyne Boyer Office Visit:     30 tablet 0     Sig: TAKE 1 TABLET BY MOUTH ONCE DAILY DUE  FOR  FULL  FASTING  PHYSICAL  FOR  FURTHER  REFILLS    Statins Protocol Passed    3/22/2018  5:30 AM       Passed - LDL on file in past 12 months    Recent Labs   Lab Test  03/21/18   0841   LDL  37            Passed - No abnormal creatine kinase in past 12 months    No lab results found.            Passed - Recent (12 mo) or future (30 days) visit within the authorizing provider's specialty    Patient had office visit in the last 12 months or has a visit in the next 30 days with authorizing provider or within the authorizing provider's specialty.  See \"Patient Info\" tab in inbasket, or \"Choose Columns\" in Meds & Orders section of the refill encounter.           Passed - Patient is age 18 or older        omeprazole (PRILOSEC) 40 MG capsule [Pharmacy Med Name: OMEPRAZOLE DR 40MG  CAP]    Last Written Prescription Date:  12/13/2016  Last Fill Quantity: 90,  # refills: 3   Last office visit: 3/14/2018 with prescribing provider:  Jocelyne Boyer Office Visit:     90 capsule 3     Sig: TAKE ONE CAPSULE BY MOUTH ONCE DAILY    PPI Protocol Passed    3/22/2018  5:30 AM       Passed - Not on Clopidogrel (unless Pantoprazole ordered)       Passed - No diagnosis of osteoporosis on record       Passed - Recent (12 mo) or future (30 days) visit within the authorizing provider's specialty    Patient had office visit in the last 12 months or has a visit in the next 30 days with authorizing provider or within the authorizing provider's specialty.  See \"Patient Info\" tab in inbasket, or \"Choose Columns\" in Meds & Orders " section of the refill encounter.           Passed - Patient is age 18 or older

## 2018-03-22 NOTE — TELEPHONE ENCOUNTER
Prescription approved per [facility] Refill Protocol.  Soco Painting, RN  Triage Nurse

## 2018-04-09 DIAGNOSIS — N18.30 BENIGN HYPERTENSION WITH CKD (CHRONIC KIDNEY DISEASE) STAGE III (H): ICD-10-CM

## 2018-04-09 DIAGNOSIS — E78.5 HYPERLIPIDEMIA LDL GOAL <130: ICD-10-CM

## 2018-04-09 DIAGNOSIS — I12.9 BENIGN HYPERTENSION WITH CKD (CHRONIC KIDNEY DISEASE) STAGE III (H): ICD-10-CM

## 2018-04-09 NOTE — TELEPHONE ENCOUNTER
"Requested Prescriptions   Pending Prescriptions Disp Refills     fenofibrate 54 MG tablet [Pharmacy Med Name: FENOFIBRATE 54MG    TAB]  Last Written Prescription Date:  12/27/2017  Last Fill Quantity: 90 tablet,  # refills: 0   Last office visit: 3/14/2018 with prescribing provider:  Jocelyne Boyer MD   Future Office Visit:     90 tablet 0     Sig: TAKE ONE TABLET BY MOUTH ONCE DAILY DUE  FOR  FASTING  LABS  AND  ANNUAL  PHYSICAL    Fibrates Passed    4/9/2018  5:31 AM       Passed - Lipid panel on file in past 12 months    Recent Labs   Lab Test  03/21/18   0841   01/12/14   0714   CHOL  117   < >  110   TRIG  202*   < >  231*   HDL  40   < >  37*   LDL  37   < >  27   NHDL  77   < >   --    VLDL   --    --   46*   CHOLHDLRATIO   --    --   3.0    < > = values in this interval not displayed.              Passed - No abnormal creatine kinase in past 12 months    No lab results found.            Passed - Recent (12 mo) or future (30 days) visit within the authorizing provider's specialty    Patient had office visit in the last 12 months or has a visit in the next 30 days with authorizing provider or within the authorizing provider's specialty.  See \"Patient Info\" tab in inbasket, or \"Choose Columns\" in Meds & Orders section of the refill encounter.           Passed - Patient is age 18 or older        atenolol (TENORMIN) 100 MG tablet [Pharmacy Med Name: ATENOLOL 100MG      TAB]  Last Written Prescription Date:  12/27/2017  Last Fill Quantity: 45 tablet,  # refills: 0   Last office visit: 3/14/2018 with prescribing provider:  Jocelyne Boyer MD    Future Office Visit:     45 tablet 0     Sig: TAKE ONE-HALF TABLET BY MOUTH ONCE DAILY    Beta-Blockers Protocol Passed    4/9/2018  5:31 AM       Passed - Blood pressure under 140/90 in past 12 months    BP Readings from Last 3 Encounters:   03/14/18 130/58   10/24/17 126/76   10/21/17 126/76                Passed - Patient is age 6 or older       " "Passed - Recent (12 mo) or future (30 days) visit within the authorizing provider's specialty    Patient had office visit in the last 12 months or has a visit in the next 30 days with authorizing provider or within the authorizing provider's specialty.  See \"Patient Info\" tab in inbasket, or \"Choose Columns\" in Meds & Orders section of the refill encounter.              "

## 2018-04-10 RX ORDER — ATENOLOL 100 MG/1
TABLET ORAL
Qty: 45 TABLET | Refills: 0 | Status: SHIPPED | OUTPATIENT
Start: 2018-04-10 | End: 2018-07-18

## 2018-04-10 RX ORDER — FENOFIBRATE 54 MG/1
54 TABLET ORAL DAILY
Qty: 90 TABLET | Refills: 0 | Status: SHIPPED | OUTPATIENT
Start: 2018-04-10 | End: 2018-07-18

## 2018-04-10 NOTE — TELEPHONE ENCOUNTER
Due for a follow up in May per 3/14/2018 LOV note.    Prescription approved per [facility] Refill Protocol.    Fatou DELGADO RN, BSN, PHN  [place] Flex RN

## 2018-05-02 DIAGNOSIS — I12.9 BENIGN HYPERTENSION WITH CKD (CHRONIC KIDNEY DISEASE) STAGE III (H): ICD-10-CM

## 2018-05-02 DIAGNOSIS — N18.30 BENIGN HYPERTENSION WITH CKD (CHRONIC KIDNEY DISEASE) STAGE III (H): ICD-10-CM

## 2018-05-04 RX ORDER — LISINOPRIL AND HYDROCHLOROTHIAZIDE 12.5; 2 MG/1; MG/1
TABLET ORAL
Qty: 180 TABLET | Refills: 2 | Status: SHIPPED | OUTPATIENT
Start: 2018-05-04 | End: 2018-11-29

## 2018-05-04 NOTE — TELEPHONE ENCOUNTER
Prescription approved per [facility] Refill Protocol.    Fatou DELGADO RN, BSN, PHN  [place] [facility] RN

## 2018-05-15 ENCOUNTER — OFFICE VISIT (OUTPATIENT)
Dept: PEDIATRICS | Facility: CLINIC | Age: 83
End: 2018-05-15
Payer: MEDICARE

## 2018-05-15 VITALS
HEART RATE: 64 BPM | OXYGEN SATURATION: 94 % | WEIGHT: 203.8 LBS | DIASTOLIC BLOOD PRESSURE: 60 MMHG | TEMPERATURE: 97.8 F | BODY MASS INDEX: 32.16 KG/M2 | SYSTOLIC BLOOD PRESSURE: 138 MMHG

## 2018-05-15 DIAGNOSIS — N40.1 BENIGN PROSTATIC HYPERPLASIA WITH LOWER URINARY TRACT SYMPTOMS, SYMPTOM DETAILS UNSPECIFIED: ICD-10-CM

## 2018-05-15 DIAGNOSIS — R30.0 DYSURIA: ICD-10-CM

## 2018-05-15 DIAGNOSIS — R82.90 NONSPECIFIC FINDING ON EXAMINATION OF URINE: ICD-10-CM

## 2018-05-15 DIAGNOSIS — Z12.5 ENCOUNTER FOR SCREENING FOR MALIGNANT NEOPLASM OF PROSTATE: ICD-10-CM

## 2018-05-15 DIAGNOSIS — N30.01 ACUTE CYSTITIS WITH HEMATURIA: Primary | ICD-10-CM

## 2018-05-15 LAB
ALBUMIN UR-MCNC: 100 MG/DL
APPEARANCE UR: ABNORMAL
BACTERIA #/AREA URNS HPF: ABNORMAL /HPF
BILIRUB UR QL STRIP: NEGATIVE
COLOR UR AUTO: YELLOW
GLUCOSE UR STRIP-MCNC: NEGATIVE MG/DL
HGB UR QL STRIP: ABNORMAL
KETONES UR STRIP-MCNC: NEGATIVE MG/DL
LEUKOCYTE ESTERASE UR QL STRIP: ABNORMAL
NITRATE UR QL: NEGATIVE
NON-SQ EPI CELLS #/AREA URNS LPF: ABNORMAL /LPF
PH UR STRIP: 5.5 PH (ref 5–7)
RBC #/AREA URNS AUTO: ABNORMAL /HPF
SOURCE: ABNORMAL
SP GR UR STRIP: 1.02 (ref 1–1.03)
UROBILINOGEN UR STRIP-ACNC: 0.2 EU/DL (ref 0.2–1)
WBC #/AREA URNS AUTO: ABNORMAL /HPF

## 2018-05-15 PROCEDURE — 87186 SC STD MICRODIL/AGAR DIL: CPT | Performed by: INTERNAL MEDICINE

## 2018-05-15 PROCEDURE — 99214 OFFICE O/P EST MOD 30 MIN: CPT | Performed by: INTERNAL MEDICINE

## 2018-05-15 PROCEDURE — 87086 URINE CULTURE/COLONY COUNT: CPT | Performed by: INTERNAL MEDICINE

## 2018-05-15 PROCEDURE — 81001 URINALYSIS AUTO W/SCOPE: CPT | Performed by: INTERNAL MEDICINE

## 2018-05-15 PROCEDURE — 87088 URINE BACTERIA CULTURE: CPT | Performed by: INTERNAL MEDICINE

## 2018-05-15 RX ORDER — CIPROFLOXACIN 500 MG/1
500 TABLET, FILM COATED ORAL 2 TIMES DAILY
Qty: 14 TABLET | Refills: 0 | Status: SHIPPED | OUTPATIENT
Start: 2018-05-15 | End: 2018-11-29

## 2018-05-15 RX ORDER — TAMSULOSIN HYDROCHLORIDE 0.4 MG/1
0.8 CAPSULE ORAL DAILY
Qty: 60 CAPSULE | Refills: 3 | Status: SHIPPED | OUTPATIENT
Start: 2018-05-15 | End: 2018-11-29

## 2018-05-15 NOTE — MR AVS SNAPSHOT
After Visit Summary   5/15/2018    Rk Aldana    MRN: [id number]           Patient Information     Date Of Birth          1935        Visit Information        Provider Department      5/15/2018 1:20 PM Jocelyne Boyer MD [facility]        Today's Diagnoses     Acute cystitis with hematuria    -  1    Dysuria        Benign prostatic hyperplasia with lower urinary tract symptoms, symptom details unspecified        Encounter for screening for malignant neoplasm of prostate           Care Instructions    Schedule appointment with urology.             Follow-ups after your visit        Additional Services     UROLOGY ADULT REFERRAL       Your provider has referred you to: [facility]: [facility] [phone]   [email or web address]    Please be aware that coverage of these services is subject to the terms and limitations of your health insurance plan.  Call member services at your health plan with any benefit or coverage questions.      Please bring the following with you to your appointment:    (1) Any X-Rays, CTs or MRIs which have been performed.  Contact the facility where they were done to arrange for  prior to your scheduled appointment.    (2) List of current medications  (3) This referral request   (4) Any documents/labs given to you for this referral                  Who to contact     If you have questions or need follow up information about today's clinic visit or your schedule please contact [facility] directly at [phone].  Normal or non-critical lab and imaging results will be communicated to you by MyChart, letter or phone within 4 business days after the clinic has received the results. If you do not hear from us within 7 days, please contact the clinic through MyChart or phone. If you have a critical or abnormal lab result, we will notify you by phone as soon as possible.  Submit refill  requests through [employer] or call your pharmacy and they will forward the refill request to us. Please allow 3 business days for your refill to be completed.          Additional Information About Your Visit        [employer] Information     [employer] gives you secure access to your electronic health record. If you see a primary care provider, you can also send messages to your care team and make appointments. If you have questions, please call your primary care clinic.  If you do not have a primary care provider, please call [phone] and they will assist you.        Care EveryWhere ID     This is your Care EveryWhere ID. This could be used by other organizations to access your [place] medical records  [id number]        Your Vitals Were     Pulse Temperature Pulse Oximetry BMI (Body Mass Index)          64 97.8  F (36.6  C) (Oral) 94% 32.16 kg/m2         Blood Pressure from Last 3 Encounters:   05/15/18 138/60   03/14/18 130/58   10/24/17 126/76    Weight from Last 3 Encounters:   05/15/18 203 lb 12.8 oz (92.4 kg)   03/14/18 208 lb 6.4 oz (94.5 kg)   10/10/17 207 lb 9.6 oz (94.2 kg)              We Performed the Following     *UA reflex to Microscopic and Culture ([place] and [facility] (except [place] and [place])     PSA, screen     Urine Microscopic     UROLOGY ADULT REFERRAL          Today's Medication Changes          These changes are accurate as of 5/15/18  2:22 PM.  If you have any questions, ask your nurse or doctor.               Start taking these medicines.        Dose/Directions    ciprofloxacin 500 MG tablet   Commonly known as:  CIPRO   Used for:  Acute cystitis with hematuria   Started by:  Jocelyne Boyer MD        Dose:  500 mg   Take 1 tablet (500 mg) by mouth 2 times daily   Quantity:  14 tablet   Refills:  0         These medicines have changed or have updated prescriptions.        Dose/Directions    tamsulosin 0.4 MG capsule   Commonly known as:  FLOMAX   This may have  changed:  how much to take   Used for:  Benign prostatic hyperplasia with lower urinary tract symptoms, symptom details unspecified   Changed by:  Jocelyne Boyer MD        Dose:  0.8 mg   Take 2 capsules (0.8 mg) by mouth daily   Quantity:  60 capsule   Refills:  3            Where to get your medicines      These medications were sent to [facility] [address] [place] [address] & [address] [address]    Hours:  24-hours Phone:  [phone]     ciprofloxacin 500 MG tablet    tamsulosin 0.4 MG capsule                Primary Care Provider Office Phone # Fax #    Jocelyne Boyer [phone]       [facility] [place] [address]        Equal Access to Services     [facility]: Hadii carlos graves hadasho Soomaali, waaxda luqadaha, qaybta kaalmada adeegyada, waxay jennain hayhiram carrion . So [facility] [phone].    ATENCIÓN: Si habla español, tiene a dickey disposición servicios gratuitos de asistencia lingüística. [facility] [phone].    We comply with applicable federal civil rights laws and [place] laws. We do not discriminate on the basis of race, color, national origin, age, disability, sex, sexual orientation, or gender identity.            Thank you!     Thank you for choosing [facility]  for your care. Our goal is always to provide you with excellent care. Hearing back from our patients is one way we can continue to improve our services. Please take a few minutes to complete the written survey that you may receive in the mail after your visit with us. Thank you!             Your Updated Medication List - Protect others around you: Learn how to safely use, store and throw away your medicines at www.disposemymeds.org.          This list is accurate as of 5/15/18  2:22 PM.  Always use your most recent med list.                   Brand Name Dispense Instructions for use  Diagnosis    aspirin 81 MG tablet     30    Take by mouth daily.        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       celecoxib 200 MG capsule    celeBREX    90 capsule    TAKE ONE CAPSULE BY MOUTH ONCE DAILY AS NEEDED FOR PAIN    Osteoarthritis, unspecified osteoarthritis type, unspecified site       ciprofloxacin 500 MG tablet    CIPRO    14 tablet    Take 1 tablet (500 mg) by mouth 2 times daily    Acute cystitis with hematuria       [facility] vitamin  B12 1000 MCG Tabs   Generic drug:  cyanocobalamin      1 tablet daily        fenofibrate 54 MG tablet     90 tablet    Take 1 tablet (54 mg) by mouth daily    Hyperlipidemia LDL goal <130       finasteride 5 MG tablet    PROSCAR    90 tablet    TAKE ONE TABLET BY MOUTH ONCE DAILY    Benign non-nodular prostatic hyperplasia without lower urinary tract symptoms       lisinopril-hydrochlorothiazide 20-12.5 MG per tablet    PRINZIDE/ZESTORETIC    180 tablet    TAKE TWO TABLETS BY MOUTH ONCE DAILY    Benign hypertension with CKD (chronic kidney disease) stage III       MUCINEX PO           multivitamin, therapeutic with minerals Tabs tablet      Take 1 tablet by mouth daily        naproxen sodium 220 MG tablet    ANAPROX    180 tablet    Take 1 tablet (220 mg) by mouth 2 times daily (with meals)    Primary osteoarthritis involving multiple joints       omeprazole 40 MG capsule    priLOSEC    90 capsule    TAKE ONE CAPSULE BY MOUTH ONCE DAILY    Gastroesophageal reflux disease without esophagitis       rosuvastatin 40 MG tablet    CRESTOR    90 tablet    TAKE 1 TABLET BY MOUTH ONCE DAILY DUE  FOR  FULL  FASTING  PHYSICAL  FOR  FURTHER  REFILLS    Hyperlipidemia LDL goal <130       tamsulosin 0.4 MG capsule    FLOMAX    60 capsule    Take 2 capsules (0.8 mg) by mouth daily    Benign prostatic hyperplasia with lower urinary tract symptoms, symptom details  unspecified       VITAMIN C PO           ZINC PO

## 2018-05-17 LAB
BACTERIA SPEC CULT: ABNORMAL
SPECIMEN SOURCE: ABNORMAL

## 2018-07-18 DIAGNOSIS — N18.30 BENIGN HYPERTENSION WITH CKD (CHRONIC KIDNEY DISEASE) STAGE III (H): ICD-10-CM

## 2018-07-18 DIAGNOSIS — E78.5 HYPERLIPIDEMIA LDL GOAL <130: ICD-10-CM

## 2018-07-18 DIAGNOSIS — I12.9 BENIGN HYPERTENSION WITH CKD (CHRONIC KIDNEY DISEASE) STAGE III (H): ICD-10-CM

## 2018-07-18 NOTE — TELEPHONE ENCOUNTER
"Requested Prescriptions   Pending Prescriptions Disp Refills     fenofibrate 54 MG tablet [Pharmacy Med Name: FENOFIBRATE 54MG    TAB]  Last Written Prescription Date:  04/10/2018  Last Fill Quantity: 90 tablet,  # refills: 0   Last office visit: 5/15/2018 with prescribing provider:  Jocelyne Boyer MD    Future Office Visit:     90 tablet 0     Sig: TAKE 1 TABLET BY MOUTH ONCE DAILY    Fibrates Passed    7/18/2018  3:25 PM       Passed - Lipid panel on file in past 12 months    Recent Labs   Lab Test  03/21/18   0841   01/12/14   0714   CHOL  117   < >  110   TRIG  202*   < >  231*   HDL  40   < >  37*   LDL  37   < >  27   NHDL  77   < >   --    VLDL   --    --   46*   CHOLHDLRATIO   --    --   3.0    < > = values in this interval not displayed.              Passed - No abnormal creatine kinase in past 12 months    No lab results found.            Passed - Recent (12 mo) or future (30 days) visit within the authorizing provider's specialty    Patient had office visit in the last 12 months or has a visit in the next 30 days with authorizing provider or within the authorizing provider's specialty.  See \"Patient Info\" tab in inbasket, or \"Choose Columns\" in Meds & Orders section of the refill encounter.           Passed - Patient is age 18 or older        atenolol (TENORMIN) 100 MG tablet [Pharmacy Med Name: ATENOLOL 100MG      TAB]  Last Written Prescription Date:  04/10/2018  Last Fill Quantity: 45 tablet,  # refills: 0   Last office visit: 5/15/2018 with prescribing provider:  Jocelyne Boyer MD    Future Office Visit:     45 tablet 0     Sig: TAKE 1/2 (ONE-HALF) TABLET BY MOUTH ONCE DAILY    Beta-Blockers Protocol Passed    7/18/2018  3:25 PM       Passed - Blood pressure under 140/90 in past 12 months    BP Readings from Last 3 Encounters:   05/15/18 138/60   03/14/18 130/58   10/24/17 126/76                Passed - Patient is age 6 or older       Passed - Recent (12 mo) or future (30 days) " "visit within the authorizing provider's specialty    Patient had office visit in the last 12 months or has a visit in the next 30 days with authorizing provider or within the authorizing provider's specialty.  See \"Patient Info\" tab in inbasket, or \"Choose Columns\" in Meds & Orders section of the refill encounter.              "

## 2018-07-22 RX ORDER — ATENOLOL 100 MG/1
TABLET ORAL
Qty: 45 TABLET | Refills: 2 | Status: SHIPPED | OUTPATIENT
Start: 2018-07-22 | End: 2018-10-23

## 2018-07-22 RX ORDER — FENOFIBRATE 54 MG/1
TABLET ORAL
Qty: 90 TABLET | Refills: 2 | Status: SHIPPED | OUTPATIENT
Start: 2018-07-22 | End: 2019-03-14

## 2018-07-22 NOTE — TELEPHONE ENCOUNTER
Prescription approved per [facility] Refill Protocol.  Rosey Bustos RN  Message handled by Nurse Triage.

## 2018-07-31 ENCOUNTER — OFFICE VISIT (OUTPATIENT)
Dept: DERMATOLOGY | Facility: CLINIC | Age: 83
End: 2018-07-31
Payer: MEDICARE

## 2018-07-31 DIAGNOSIS — L82.1 SEBORRHEIC KERATOSIS: ICD-10-CM

## 2018-07-31 DIAGNOSIS — L57.0 AK (ACTINIC KERATOSIS): Primary | ICD-10-CM

## 2018-07-31 PROCEDURE — 17000 DESTRUCT PREMALG LESION: CPT | Performed by: DERMATOLOGY

## 2018-07-31 PROCEDURE — 99213 OFFICE O/P EST LOW 20 MIN: CPT | Mod: 25 | Performed by: DERMATOLOGY

## 2018-07-31 NOTE — MR AVS SNAPSHOT
After Visit Summary   7/31/2018    Rk Aldana    MRN: [id number]           Patient Information     Date Of Birth          1935        Visit Information        Provider Department      7/31/2018 2:30 PM Lupe Hyatt MD [facility]        Today's Diagnoses     AK (actinic keratosis)    -  1    Seborrheic keratosis           Follow-ups after your visit        Who to contact     If you have questions or need follow up information about today's clinic visit or your schedule please contact [facility] directly at [phone].  Normal or non-critical lab and imaging results will be communicated to you by Kat[employer], letter or phone within 4 business days after the clinic has received the results. If you do not hear from us within 7 days, please contact the clinic through [employer] or phone. If you have a critical or abnormal lab result, we will notify you by phone as soon as possible.  Submit refill requests through [employer] or call your pharmacy and they will forward the refill request to us. Please allow 3 business days for your refill to be completed.          Additional Information About Your Visit        MyChart Information     [employer] gives you secure access to your electronic health record. If you see a primary care provider, you can also send messages to your care team and make appointments. If you have questions, please call your primary care clinic.  If you do not have a primary care provider, please call [phone] and they will assist you.        Care EveryWhere ID     This is your Care EveryWhere ID. This could be used by other organizations to access your [facility] medical records  [id number]         Blood Pressure from Last 3 Encounters:   05/15/18 138/60   03/14/18 130/58   10/24/17 126/76    Weight from Last 3 Encounters:   05/15/18 92.4 kg (203 lb 12.8 oz)   03/14/18 94.5 kg (208 lb 6.4 oz)   10/10/17 94.2 kg (207 lb 9.6 oz)              We Performed  the Following     DESTRUCT PREMALIGNANT LESION, FIRST        Primary Care Provider Office Phone # Fax #    Jocelyne Boyer [phone] [address] [place] [address]        Equal Access to Services     ENMA GAUTAM : Hadii aad ku hadhailyo Soomaali, waaxda luqadaha, qaybta kaalmada adeegyada, waxbhargav hernandesn genesis stewart lalindabrock hurd. So [facility] [phone].    ATENCIÓN: Si habla español, tiene a dickey disposición servicios gratuitos de asistencia lingüística. Llame al [phone].    We comply with applicable federal civil rights laws and [place] laws. We do not discriminate on the basis of race, color, national origin, age, disability, sex, sexual orientation, or gender identity.            Thank you!     Thank you for choosing [facility]  for your care. Our goal is always to provide you with excellent care. Hearing back from our patients is one way we can continue to improve our services. Please take a few minutes to complete the written survey that you may receive in the mail after your visit with us. Thank you!             Your Updated Medication List - Protect others around you: Learn how to safely use, store and throw away your medicines at www.disposemymeds.org.          This list is accurate as of 7/31/18 11:59 PM.  Always use your most recent med list.                   Brand Name Dispense Instructions for use Diagnosis    aspirin 81 MG tablet     30    Take by mouth daily.        atenolol 100 MG tablet    TENORMIN    45 tablet    TAKE 1/2 (ONE-HALF) TABLET BY MOUTH ONCE DAILY    Benign hypertension with CKD (chronic kidney disease) stage III       celecoxib 200 MG capsule    celeBREX    90 capsule    TAKE ONE CAPSULE BY MOUTH ONCE DAILY AS NEEDED FOR PAIN    Osteoarthritis, unspecified osteoarthritis type, unspecified site       ciprofloxacin 500 MG tablet    CIPRO    14 tablet    Take 1 tablet (500 mg) by mouth 2 times daily    Acute cystitis with  hematuria       [facility] vitamin  B12 1000 MCG Tabs   Generic drug:  cyanocobalamin      1 tablet daily        fenofibrate 54 MG tablet     90 tablet    TAKE 1 TABLET BY MOUTH ONCE DAILY    Hyperlipidemia LDL goal <130       finasteride 5 MG tablet    PROSCAR    90 tablet    TAKE ONE TABLET BY MOUTH ONCE DAILY    Benign non-nodular prostatic hyperplasia without lower urinary tract symptoms       lisinopril-hydrochlorothiazide 20-12.5 MG per tablet    PRINZIDE/ZESTORETIC    180 tablet    TAKE TWO TABLETS BY MOUTH ONCE DAILY    Benign hypertension with CKD (chronic kidney disease) stage III       multivitamin, therapeutic with minerals Tabs tablet      Take 1 tablet by mouth daily        naproxen sodium 220 MG tablet    ANAPROX    180 tablet    Take 1 tablet (220 mg) by mouth 2 times daily (with meals)    Primary osteoarthritis involving multiple joints       omeprazole 40 MG capsule    priLOSEC    90 capsule    TAKE ONE CAPSULE BY MOUTH ONCE DAILY    Gastroesophageal reflux disease without esophagitis       rosuvastatin 40 MG tablet    CRESTOR    90 tablet    TAKE 1 TABLET BY MOUTH ONCE DAILY DUE  FOR  FULL  FASTING  PHYSICAL  FOR  FURTHER  REFILLS    Hyperlipidemia LDL goal <130       tamsulosin 0.4 MG capsule    FLOMAX    60 capsule    Take 2 capsules (0.8 mg) by mouth daily    Benign prostatic hyperplasia with lower urinary tract symptoms, symptom details unspecified       VITAMIN C PO           ZINC PO

## 2018-07-31 NOTE — LETTER
7/31/2018      RE: Rk Aldana  [address]  [place] [address]       Dermatology Clinic Visit Note      Service Date: 07/31/2018      DERMATOLOGY PROBLEM LIST:   1.  Actinic keratoses.   2.  Seborrheic keratoses.      HISTORY OF PRESENT ILLNESS:  Mr. Aldana is an 83-year-old male returning to Dermatology Clinic for recheck of a lesion on the left scalp.  He was last seen on 10/24/2017.  At that time I treated actinic keratoses on the scalp.  He notes that he has one residual lesion.  He has no other concerning spots.  No bleeding or painful areas.      REVIEW OF SYSTEMS:  Feels well without other skin concerns.      SOCIAL HISTORY:  The patient is retired.  His wife is currently in a nursing home with her multiple sclerosis.     Patient Active Problem List   Diagnosis     BILATERAL CAROTID BRUITS     B-complex deficiency     BPH (benign prostatic hyperplasia)     Diverticulosis of colon with hemorrhage     Hyperlipidemia with target LDL less than 130     Advanced directives, counseling/discussion     CAD (coronary artery disease)     Hx of adenomatous colonic polyps     Prediabetes     Back pain     GERD (gastroesophageal reflux disease)     CKD (chronic kidney disease) stage 3, GFR 30-59 ml/min     Benign hypertension with CKD (chronic kidney disease) stage III     Obesity (BMI 30-39.9)     Left medial knee pain     Obesity     AK (actinic keratosis)     Seborrheic keratosis       Allergies   Allergen Reactions     Penicillins      Pen-     Seasonal Allergies          Current Outpatient Prescriptions   Medication     Ascorbic Acid (VITAMIN C PO)     aspirin 81 MG tablet     atenolol (TENORMIN) 100 MG tablet     celecoxib (CELEBREX) 200 MG capsule     ciprofloxacin (CIPRO) 500 MG tablet     CVS VITAMIN B12 1000 MCG OR TABS     fenofibrate 54 MG tablet     finasteride (PROSCAR) 5 MG tablet     lisinopril-hydrochlorothiazide (PRINZIDE/ZESTORETIC) 20-12.5 MG per tablet     Multiple Vitamins-Minerals (ZINC  PO)     multivitamin, therapeutic with minerals (THERA-VIT-M) TABS     naproxen sodium (ANAPROX) 220 MG tablet     omeprazole (PRILOSEC) 40 MG capsule     rosuvastatin (CRESTOR) 40 MG tablet     tamsulosin (FLOMAX) 0.4 MG capsule     No current facility-administered medications for this visit.           PHYSICAL EXAMINATION:   GENERAL:  The patient is a healthy and interactive, 83-year-old male in no distress.   HEENT:  Conjunctivae are clear.   PULMONARY:  Breathing comfortably on room air.   SKIN:  Exam is focused on the scalp, face, neck, chest, back, arms, hands.  Skin exam is normal except for as follows:   - Examination of the back and arms shows waxy, hyperkeratotic papules.   - Gritty papule x1 on the left parietal scalp.   - Scattered, medium-brown macules on the chest and back.      ASSESSMENT AND PLAN:   1.  Actinic keratosis:  One lesion on the scalp treated with 10 second freeze/thaw cycle with liquid nitrogen.   2.  Seborrheic keratoses:  Benign, hyperkeratotic papules.  No treatment advised.      The patient to return in a year or sooner if concern.     Lupe Hyatt MD  Dermatology Staff       cc:   [place] [address]              D: 2018   T: 2018   MT: [email or web address]      Name:     BILLY SHELLEY   MRN:      [id number]        Account:      [id number]   :      1935           Service Date: 2018      Document: [id number]

## 2018-08-01 NOTE — PROGRESS NOTES
Dermatology Clinic Visit Note      Service Date: 07/31/2018      DERMATOLOGY PROBLEM LIST:   1.  Actinic keratoses.   2.  Seborrheic keratoses.      HISTORY OF PRESENT ILLNESS:  Mr. Aldana is an 83-year-old male returning to Dermatology Clinic for recheck of a lesion on the left scalp.  He was last seen on 10/24/2017.  At that time I treated actinic keratoses on the scalp.  He notes that he has one residual lesion.  He has no other concerning spots.  No bleeding or painful areas.      REVIEW OF SYSTEMS:  Feels well without other skin concerns.      SOCIAL HISTORY:  The patient is retired.  His wife is currently in a nursing home with her multiple sclerosis.     Patient Active Problem List   Diagnosis     BILATERAL CAROTID BRUITS     B-complex deficiency     BPH (benign prostatic hyperplasia)     Diverticulosis of colon with hemorrhage     Hyperlipidemia with target LDL less than 130     Advanced directives, counseling/discussion     CAD (coronary artery disease)     Hx of adenomatous colonic polyps     Prediabetes     Back pain     GERD (gastroesophageal reflux disease)     CKD (chronic kidney disease) stage 3, GFR 30-59 ml/min     Benign hypertension with CKD (chronic kidney disease) stage III     Obesity (BMI 30-39.9)     Left medial knee pain     Obesity     AK (actinic keratosis)     Seborrheic keratosis       Allergies   Allergen Reactions     Penicillins      Pen-     Seasonal Allergies          Current Outpatient Prescriptions   Medication     Ascorbic Acid (VITAMIN C PO)     aspirin 81 MG tablet     atenolol (TENORMIN) 100 MG tablet     celecoxib (CELEBREX) 200 MG capsule     ciprofloxacin (CIPRO) 500 MG tablet     CVS VITAMIN B12 1000 MCG OR TABS     fenofibrate 54 MG tablet     finasteride (PROSCAR) 5 MG tablet     lisinopril-hydrochlorothiazide (PRINZIDE/ZESTORETIC) 20-12.5 MG per tablet     Multiple Vitamins-Minerals (ZINC PO)     multivitamin, therapeutic with minerals (THERA-VIT-M) TABS     naproxen  sodium (ANAPROX) 220 MG tablet     omeprazole (PRILOSEC) 40 MG capsule     rosuvastatin (CRESTOR) 40 MG tablet     tamsulosin (FLOMAX) 0.4 MG capsule     No current facility-administered medications for this visit.           PHYSICAL EXAMINATION:   GENERAL:  The patient is a healthy and interactive, 83-year-old male in no distress.   HEENT:  Conjunctivae are clear.   PULMONARY:  Breathing comfortably on room air.   SKIN:  Exam is focused on the scalp, face, neck, chest, back, arms, hands.  Skin exam is normal except for as follows:   - Examination of the back and arms shows waxy, hyperkeratotic papules.   - Gritty papule x1 on the left parietal scalp.   - Scattered, medium-brown macules on the chest and back.      ASSESSMENT AND PLAN:   1.  Actinic keratosis:  One lesion on the scalp treated with 10 second freeze/thaw cycle with liquid nitrogen.   2.  Seborrheic keratoses:  Benign, hyperkeratotic papules.  No treatment advised.      The patient to return in a year or sooner if concern.     Lupe Hyatt MD  Dermatology Staff       cc:   [place] [address]         LUPE HYATT MD             D: 2018   T: 2018   MT: [email or web address]      Name:     BILLY SHELLEY   MRN:      [id number]        Account:      [id number]   :      1935           Service Date: 2018      Document: [id number]

## 2018-08-30 ENCOUNTER — TELEPHONE (OUTPATIENT)
Dept: PEDIATRICS | Facility: CLINIC | Age: 83
End: 2018-08-30

## 2018-08-30 NOTE — TELEPHONE ENCOUNTER
Pt states that his last rx for flomax went to [facility] in [facility] in May. Pt wanted to transfer his rx to [facility] in [place] for cost, but [facility] transferred his previous rx(0.4 mg 1 cap every day) to [facility], now his insurance doesn't pay because of its too soon for a refill.     Pt requesting call back at [phone] after transferring the rx to [facility] pharmacy.    Called [facility] pharmacy, provided verbal order to pharmacist as below, they will update his med list. Notified pt as well.     Notes from 5/15/18:  Benign prostatic hyperplasia with lower urinary tract symptoms, symptom details unspecified  -PSA today  -pt feels he is not emptying completely; will try increasing flomax to 0.8mg   -pt to monitor for dizziness/orthostasis   Plan: tamsulosin (FLOMAX) 0.4 MG capsule    tamsulosin (FLOMAX) 0.4 MG capsule 60 capsule 3 5/15/2018  --   Sig - Route: Take 2 capsules (0.8 mg) by mouth daily - Oral     Maurizio, RN  Triage Nurse

## 2018-10-23 ENCOUNTER — TELEPHONE (OUTPATIENT)
Dept: PEDIATRICS | Facility: CLINIC | Age: 83
End: 2018-10-23

## 2018-10-23 DIAGNOSIS — N18.30 BENIGN HYPERTENSION WITH CKD (CHRONIC KIDNEY DISEASE) STAGE III (H): ICD-10-CM

## 2018-10-23 DIAGNOSIS — I12.9 BENIGN HYPERTENSION WITH CKD (CHRONIC KIDNEY DISEASE) STAGE III (H): ICD-10-CM

## 2018-10-23 RX ORDER — ATENOLOL 50 MG/1
50 TABLET ORAL DAILY
Qty: 30 TABLET | Refills: 1 | Status: SHIPPED | OUTPATIENT
Start: 2018-10-23 | End: 2018-11-29

## 2018-10-23 RX ORDER — ATENOLOL 100 MG/1
TABLET ORAL
Qty: 45 TABLET | Refills: 2 | Status: CANCELLED | OUTPATIENT
Start: 2018-10-23

## 2018-10-23 NOTE — TELEPHONE ENCOUNTER
Spoke to pt. He confirmed understanding.     Martha Kaminski CMA ([facility])

## 2018-10-23 NOTE — TELEPHONE ENCOUNTER
Patient would like to be switched to 50mg tabs once daily of the atenolol. He is having difficulty cutting the tabs at home and would like to be switched back to the 50mg tabs if at all possible. Will run out of medication on Friday, 10/26/18.  -Priti Gray

## 2018-10-23 NOTE — TELEPHONE ENCOUNTER
He will call to schedule follow up visit.     Martha Kaminski CMA ([facility])

## 2018-10-23 NOTE — TELEPHONE ENCOUNTER
Script sent for 30 day supply.  Pt is due for f/u visit; should be seen every 6 months.   Jocelyne Boyer MD

## 2018-11-05 ENCOUNTER — TELEPHONE (OUTPATIENT)
Dept: PEDIATRICS | Facility: CLINIC | Age: 83
End: 2018-11-05

## 2018-11-05 NOTE — TELEPHONE ENCOUNTER
"Reason for Call:  Other call back    Detailed comments: The pt was calling and he would like to speak to the care team and find out if Dr. Boyer will still be seeing him with his [employer] insurance. I informed the pt it would be best if he spoke with his insurance and he says he did and they said as of Dec 5th he won't be able to see the md anymore. I told the pt his insurance company would be the best source for that information but he still wanted to check with his care team stating \"Dr. Boyer would know, she is a pretty smart gal.\"    Phone Number Patient can be reached at: Home number on file [phone] (home)    Best Time: Anytime    Can we leave a detailed message on this number? YES    Call taken on 11/5/2018 at 10:13 AM by Philomena Miranda      "

## 2018-11-29 ENCOUNTER — OFFICE VISIT (OUTPATIENT)
Dept: PEDIATRICS | Facility: CLINIC | Age: 83
End: 2018-11-29
Payer: MEDICARE

## 2018-11-29 VITALS
WEIGHT: 210.06 LBS | TEMPERATURE: 98.2 F | OXYGEN SATURATION: 97 % | SYSTOLIC BLOOD PRESSURE: 140 MMHG | HEIGHT: 67 IN | DIASTOLIC BLOOD PRESSURE: 60 MMHG | BODY MASS INDEX: 32.97 KG/M2 | HEART RATE: 70 BPM

## 2018-11-29 DIAGNOSIS — I12.9 BENIGN HYPERTENSION WITH CKD (CHRONIC KIDNEY DISEASE) STAGE III (H): Primary | ICD-10-CM

## 2018-11-29 DIAGNOSIS — R20.2 NUMBNESS AND TINGLING OF BOTH FEET: ICD-10-CM

## 2018-11-29 DIAGNOSIS — N18.30 BENIGN HYPERTENSION WITH CKD (CHRONIC KIDNEY DISEASE) STAGE III (H): Primary | ICD-10-CM

## 2018-11-29 DIAGNOSIS — M25.552 HIP PAIN, LEFT: ICD-10-CM

## 2018-11-29 DIAGNOSIS — E78.5 HYPERLIPIDEMIA LDL GOAL <130: ICD-10-CM

## 2018-11-29 DIAGNOSIS — I25.10 CORONARY ARTERY DISEASE INVOLVING NATIVE HEART WITHOUT ANGINA PECTORIS, UNSPECIFIED VESSEL OR LESION TYPE: ICD-10-CM

## 2018-11-29 DIAGNOSIS — R20.0 NUMBNESS AND TINGLING OF BOTH FEET: ICD-10-CM

## 2018-11-29 DIAGNOSIS — N40.0 BENIGN NON-NODULAR PROSTATIC HYPERPLASIA WITHOUT LOWER URINARY TRACT SYMPTOMS: ICD-10-CM

## 2018-11-29 PROCEDURE — 99214 OFFICE O/P EST MOD 30 MIN: CPT | Performed by: INTERNAL MEDICINE

## 2018-11-29 RX ORDER — ATENOLOL 100 MG/1
100 TABLET ORAL DAILY
Qty: 30 TABLET | Refills: 1 | Status: SHIPPED | OUTPATIENT
Start: 2018-11-29 | End: 2019-01-04

## 2018-11-29 RX ORDER — TAMSULOSIN HYDROCHLORIDE 0.4 MG/1
0.8 CAPSULE ORAL DAILY
Qty: 180 CAPSULE | Refills: 3 | Status: SHIPPED | OUTPATIENT
Start: 2018-11-29 | End: 2019-09-13

## 2018-11-29 RX ORDER — FINASTERIDE 5 MG/1
1 TABLET, FILM COATED ORAL DAILY
Qty: 90 TABLET | Refills: 3 | Status: SHIPPED | OUTPATIENT
Start: 2018-11-29 | End: 2019-09-13

## 2018-11-29 RX ORDER — LISINOPRIL AND HYDROCHLOROTHIAZIDE 12.5; 2 MG/1; MG/1
TABLET ORAL
Qty: 180 TABLET | Refills: 3 | Status: SHIPPED | OUTPATIENT
Start: 2018-11-29 | End: 2019-09-13

## 2018-11-29 NOTE — MR AVS SNAPSHOT
After Visit Summary   11/29/2018    Rk Aldana    MRN: [id number]           Patient Information     Date Of Birth          1935        Visit Information        Provider Department      11/29/2018 9:00 AM Jocelyne Boyer MD [facility] [place]        Today's Diagnoses     Benign hypertension with CKD (chronic kidney disease) stage III (H)    -  1    Hip pain, left        Numbness and tingling of both feet        Coronary artery disease involving native heart without angina pectoris, unspecified vessel or lesion type        Benign non-nodular prostatic hyperplasia without lower urinary tract symptoms        Hyperlipidemia LDL goal <130          Care Instructions    Increase Atenolol to 100 MG daily. I would prefer your blood pressure in the 120's, but 130's are okay. Take blood pressure more often too, please. Bring blood pressure log (or machine) at next visit.      Try cutting back to 1 piece of escalante in the morning and get rid of the hash browns in the morning.     Schedule with physical therapy for your hip. Ask when they call if there is a location closer to your home.     If you take a decongestant it needs to be one for high blood pressure.           Follow-ups after your visit        Additional Services     [facility] PT, HAND, AND CHIROPRACTIC REFERRAL       Physical Therapy, Hand Therapy and Chiropractic Care are available through:  *[facility]  *Hand Therapy (Occupational Therapy or Physical Therapy)  *[facility]    Call one number to schedule at any of the above locations: [phone].    Physical therapy, Hand therapy and/or Chiropractic care has been recommended by your physician as an excellent treatment option to reduce pain and help people return to normal activities, including sports.  Therapy and/or chiropractic care services are a great complement or alternative to expensive and invasive surgery, injections, or long-term  use of prescription medications. The primary goal is to identify the underlying problem and provide you the tools to manage your condition on your own.     Please be aware that coverage of these services is subject to the terms and limitations of your health insurance plan.  Call member services at your health plan with any benefit or coverage questions.      Please bring the following to your appointment:  *Your personal calendar for scheduling future appointments  *Comfortable clothing                  Follow-up notes from your care team     Return in 1 month (on 12/29/2018) for Blood pressure follow up.      Your next 10 appointments already scheduled     Jan 04, 2019  8:40 AM CST   Office Visit with Jocelyne Boyer MD   [facility] ([facility])    [address]  [address]  [facility] [address] [phone]           Bring a current list of meds and any records pertaining to this visit. For Physicals, please bring immunization records and any forms needing to be filled out. Please arrive 10 minutes early to complete paperwork.              Future tests that were ordered for you today     Open Future Orders        Priority Expected Expires Ordered    [facility] PT, HAND, AND CHIROPRACTIC REFERRAL Routine  11/29/2019 11/29/2018            Who to contact     If you have questions or need follow up information about today's clinic visit or your schedule please contact [facility] directly at [phone].  Normal or non-critical lab and imaging results will be communicated to you by MyChart, letter or phone within 4 business days after the clinic has received the results. If you do not hear from us within 7 days, please contact the clinic through MyChart or phone. If you have a critical or abnormal lab result, we will notify you by phone as soon as possible.  Submit refill requests through [employer] or call your pharmacy and they will forward the refill request to  "us. Please allow 3 business days for your refill to be completed.          Additional Information About Your Visit        [employer] Information     [employer] gives you secure access to your electronic health record. If you see a primary care provider, you can also send messages to your care team and make appointments. If you have questions, please call your primary care clinic.  If you do not have a primary care provider, please call [phone] and they will assist you.        Care EveryWhere ID     This is your Care EveryWhere ID. This could be used by other organizations to access your [facility] medical records  [id number]        Your Vitals Were     Pulse Temperature Height Pulse Oximetry BMI (Body Mass Index)       70 98.2  F (36.8  C) (Tympanic) 5' 6.5\" (1.689 m) 97% 33.4 kg/m2        Blood Pressure from Last 3 Encounters:   11/29/18 140/60   05/15/18 138/60   03/14/18 130/58    Weight from Last 3 Encounters:   11/29/18 210 lb 1 oz (95.3 kg)   05/15/18 203 lb 12.8 oz (92.4 kg)   03/14/18 208 lb 6.4 oz (94.5 kg)                 Today's Medication Changes          These changes are accurate as of 11/29/18  9:56 AM.  If you have any questions, ask your nurse or doctor.               These medicines have changed or have updated prescriptions.        Dose/Directions    atenolol 100 MG tablet   Commonly known as:  TENORMIN   This may have changed:    - medication strength  - how much to take   Used for:  Benign hypertension with CKD (chronic kidney disease) stage III (H), Coronary artery disease involving native heart without angina pectoris, unspecified vessel or lesion type   Changed by:  Jocelyne Boyer MD        Dose:  100 mg   Take 1 tablet (100 mg) by mouth daily   Quantity:  30 tablet   Refills:  1       finasteride 5 MG tablet   Commonly known as:  PROSCAR   This may have changed:  See the new instructions.   Used for:  Benign non-nodular prostatic hyperplasia without lower urinary tract symptoms "   Changed by:  Jocelyne Boyer MD        Dose:  1 tablet   Take 1 tablet (5 mg) by mouth daily   Quantity:  90 tablet   Refills:  3            Where to get your medicines      These medications were sent to [facility] [place], [place] - [address]  [address] [place] [address]     Phone:  [phone]     atenolol 100 MG tablet    finasteride 5 MG tablet    lisinopril-hydrochlorothiazide 20-12.5 MG per tablet    tamsulosin 0.4 MG capsule                Primary Care Provider Office Phone # Fax #    Jocelyne Boyer [phone] [address] [place] [address]        Equal Access to Services     [facility]: Hadii carlos graves hadasho Sokate, waaxda luqadaha, qaybta kaalmada adeegyada, fam westin connie carrion . So [facility] [phone].    ATENCIÓN: Si habla español, tiene a dickey disposición servicios gratuitos de asistencia lingüística. [facility] [phone].    We comply with applicable federal civil rights laws and [place] laws. We do not discriminate on the basis of race, color, national origin, age, disability, sex, sexual orientation, or gender identity.            Thank you!     Thank you for choosing [facility]  for your care. Our goal is always to provide you with excellent care. Hearing back from our patients is one way we can continue to improve our services. Please take a few minutes to complete the written survey that you may receive in the mail after your visit with us. Thank you!             Your Updated Medication List - Protect others around you: Learn how to safely use, store and throw away your medicines at www.disposemymeds.org.          This list is accurate as of 11/29/18  9:56 AM.  Always use your most recent med list.                   Brand Name Dispense Instructions for use Diagnosis    aspirin 81 MG tablet    ASA    30    Take by mouth daily.        atenolol 100 MG tablet    TENORMIN    30  tablet    Take 1 tablet (100 mg) by mouth daily    Benign hypertension with CKD (chronic kidney disease) stage III (H), Coronary artery disease involving native heart without angina pectoris, unspecified vessel or lesion type       celecoxib 200 MG capsule    celeBREX    90 capsule    TAKE ONE CAPSULE BY MOUTH ONCE DAILY AS NEEDED FOR PAIN    Osteoarthritis, unspecified osteoarthritis type, unspecified site       [facility] vitamin  B12 1000 MCG tablet   Generic drug:  vitamin B-12      1 tablet daily        fenofibrate 54 MG tablet     90 tablet    TAKE 1 TABLET BY MOUTH ONCE DAILY    Hyperlipidemia LDL goal <130       finasteride 5 MG tablet    PROSCAR    90 tablet    Take 1 tablet (5 mg) by mouth daily    Benign non-nodular prostatic hyperplasia without lower urinary tract symptoms       lisinopril-hydrochlorothiazide 20-12.5 MG per tablet    PRINZIDE/ZESTORETIC    180 tablet    TAKE TWO TABLETS BY MOUTH ONCE DAILY    Benign hypertension with CKD (chronic kidney disease) stage III (H)       multivitamin w/minerals tablet      Take 1 tablet by mouth daily        naproxen sodium 220 MG tablet    ANAPROX    180 tablet    Take 1 tablet (220 mg) by mouth 2 times daily (with meals)    Primary osteoarthritis involving multiple joints       omeprazole 40 MG DR capsule    priLOSEC    90 capsule    TAKE ONE CAPSULE BY MOUTH ONCE DAILY    Gastroesophageal reflux disease without esophagitis       rosuvastatin 40 MG tablet    CRESTOR    90 tablet    TAKE 1 TABLET BY MOUTH ONCE DAILY DUE  FOR  FULL  FASTING  PHYSICAL  FOR  FURTHER  REFILLS    Hyperlipidemia LDL goal <130       tamsulosin 0.4 MG capsule    FLOMAX    180 capsule    Take 2 capsules (0.8 mg) by mouth daily    Benign non-nodular prostatic hyperplasia without lower urinary tract symptoms       VITAMIN C PO           ZINC PO

## 2018-11-29 NOTE — PATIENT INSTRUCTIONS
Increase Atenolol to 100 MG daily. I would prefer your blood pressure in the 120's, but 130's are okay. Take blood pressure more often too, please. Bring blood pressure log (or machine) at next visit.      Try cutting back to 1 piece of escalante in the morning and get rid of the hash browns in the morning.     Schedule with physical therapy for your hip. Ask when they call if there is a location closer to your home.     If you take a decongestant it needs to be one for high blood pressure.

## 2018-11-29 NOTE — PROGRESS NOTES
"  SUBJECTIVE:   Rk Aldana is a 83 year old male who presents to clinic today for the following health issues:      Hypertension Follow-up      Outpatient blood pressures are not being checked.    Low Salt Diet:  No- adds salt       Amount of exercise or physical activity: [facility]     Problems taking medications regularly: No    Medication side effects: none    Diet: regular (no restrictions)    Notes he has not been checking his blood pressures at home but when he does they are in the 140's systolic.     He would like his R ear to be checked since a couple weeks ago he developed some water in his ear. Yesterday he was at the nursing home and turned 180 degrees he became a little dizzy, he sat down for a while, BP was 160, O2 and pulse was normal. This was a strange occurrence for him but he thinks it might have been his ear.     Patient reports he is struggling with caring for his wife who is chronically ill and in a nursing home. Concerned since he is being treated now as a \"visitor\" and not her care giver. He understands now that if he was still her full time care giver something would happen to his health. He is now going to the [facility] in the morning and is going to get a . He does not believe he is depressed, but he has some episodes of waking up at night and not able to go back to sleep due to racing thoughts. He is able to get up and sit in the chair with cocoa and get back to sleep. States he is no longer eating meat since he no longer desires it. Energy and motivation has been reduced.     Bilateral hips L>R is still bothering him. He would like to try physical therapy. States he now has some numbness in his L and R toes and numbness is moving up into foot pad on L. Endorses Hx of spinal stenosis.     Arthritis is worsening in hands.     Bladder is still leaking, more now than normal.     States his balance is worse now and he thinks it is due to the numbness in his toes. " Needs to step forward when bending over in order not to fall over.     Patient reports he has a chronic cough; doesn't bother patient he doesn't even notice.       Problem list and histories reviewed & adjusted, as indicated.  Additional history: as documented    Patient Active Problem List   Diagnosis     BILATERAL CAROTID BRUITS     B-complex deficiency     BPH (benign prostatic hyperplasia)     Diverticulosis of colon with hemorrhage     Hyperlipidemia with target LDL less than 130     Advanced directives, counseling/discussion     CAD (coronary artery disease)     Hx of adenomatous colonic polyps     Prediabetes     Back pain     GERD (gastroesophageal reflux disease)     CKD (chronic kidney disease) stage 3, GFR 30-59 ml/min (H)     Benign hypertension with CKD (chronic kidney disease) stage III (H)     Obesity (BMI 30-39.9)     Left medial knee pain     Obesity     AK (actinic keratosis)     Seborrheic keratosis     Past Surgical History:   Procedure Laterality Date     SURGICAL HISTORY OF -   03/27/00    Colonoscopy       Social History   Substance Use Topics     Smoking status: Never Smoker     Smokeless tobacco: Never Used     Alcohol use Yes      Comment: rarely     Family History   Problem Relation Age of Onset     Connective Tissue Disorder Mother      LUPUS     Cancer - colorectal Father      70         Current Outpatient Prescriptions   Medication Sig Dispense Refill     Ascorbic Acid (VITAMIN C PO)        aspirin 81 MG tablet Take by mouth daily.  30 0     atenolol (TENORMIN) 50 MG tablet Take 1 tablet (50 mg) by mouth daily 30 tablet 1     celecoxib (CELEBREX) 200 MG capsule TAKE ONE CAPSULE BY MOUTH ONCE DAILY AS NEEDED FOR PAIN 90 capsule 3     CVS VITAMIN B12 1000 MCG OR TABS 1 tablet daily       fenofibrate 54 MG tablet TAKE 1 TABLET BY MOUTH ONCE DAILY 90 tablet 2     finasteride (PROSCAR) 5 MG tablet TAKE ONE TABLET BY MOUTH ONCE DAILY 90 tablet 3     lisinopril-hydrochlorothiazide  "(PRINZIDE/ZESTORETIC) 20-12.5 MG per tablet TAKE TWO TABLETS BY MOUTH ONCE DAILY 180 tablet 2     Multiple Vitamins-Minerals (ZINC PO)        multivitamin, therapeutic with minerals (THERA-VIT-M) TABS Take 1 tablet by mouth daily       naproxen sodium (ANAPROX) 220 MG tablet Take 1 tablet (220 mg) by mouth 2 times daily (with meals) 180 tablet 3     omeprazole (PRILOSEC) 40 MG capsule TAKE ONE CAPSULE BY MOUTH ONCE DAILY 90 capsule 3     rosuvastatin (CRESTOR) 40 MG tablet TAKE 1 TABLET BY MOUTH ONCE DAILY DUE  FOR  FULL  FASTING  PHYSICAL  FOR  FURTHER  REFILLS 90 tablet 3     tamsulosin (FLOMAX) 0.4 MG capsule Take 2 capsules (0.8 mg) by mouth daily 60 capsule 3     Allergies   Allergen Reactions     Penicillins      Pen-     Seasonal Allergies      BP Readings from Last 3 Encounters:   11/29/18 140/60   05/15/18 138/60   03/14/18 130/58    Wt Readings from Last 3 Encounters:   11/29/18 95.3 kg (210 lb 1 oz)   05/15/18 92.4 kg (203 lb 12.8 oz)   03/14/18 94.5 kg (208 lb 6.4 oz)                  Labs reviewed in EPIC    Reviewed and updated as needed this visit by clinical staff       Reviewed and updated as needed this visit by Provider         ROS:  Constitutional, HEENT, cardiovascular, pulmonary, GI, , musculoskeletal, neuro, skin, endocrine and psych systems are negative, except as otherwise noted.    This document serves as a record of the services and decisions personally performed and made by Jocelyne Boyer MD. It was created on her behalf by Sri Jenkins, a trained medical scribe. The creation of this document is based the provider's statements to the medical scribe.    Sri Jenkins November 29, 2018 9:21 AM  OBJECTIVE:     /60 (BP Location: Right arm, Patient Position: Chair, Cuff Size: Adult Large)  Pulse 70  Temp 98.2  F (36.8  C) (Tympanic)  Ht 1.689 m (5' 6.5\")  Wt 95.3 kg (210 lb 1 oz)  SpO2 97%  BMI 33.4 kg/m2  Body mass index is 33.4 kg/(m^2).  GENERAL:  alert and no " distress  HENT: ear canals and TM's normal, nose and mouth without ulcers or lesions  RESP: lungs clear to auscultation - no rales, rhonchi or wheezes  CV: regular rate and rhythm, normal S1 S2, no S3 or S4, no murmur, click or rub, no peripheral edema   ABDOMEN: soft, nontender,  and bowel sounds normal. Unable to assess HSM or masses due to body habitus   MS: no gross musculoskeletal defects noted, no edema  NEURO: Normal strength and tone, mentation intact and speech normal  PSYCH: mentation appears normal, affect normal/bright    Diagnostic Test Results:  No results found for this or any previous visit (from the past 24 hour(s)).    ASSESSMENT/PLAN:   9:28-9:57    (I12.9,  N18.3) Benign hypertension with CKD (chronic kidney disease) stage III (H)  (primary encounter diagnosis)  -- BP still too elevated; 140 systolic in clinic   -- will increase atenolol to 100 MG   -- encouraged regular exercise and balanced diet   -- advised to check BP regularly and bring machine to next visit in 1 month   -- advised patient if he uses a decongestant it needs to be for high blood pressure   Plan: lisinopril-hydrochlorothiazide         (PRINZIDE/ZESTORETIC) 20-12.5 MG per tablet,         atenolol (TENORMIN) 100 MG tablet      (M25.552) Hip pain, left  -- concerned this may be due to a spinal stenosis, especially considering numbness in his feet   -- will try physical therapy to start and then will MRI if not improving   -- referral to physical therapy; advised patient to ask scheduling if there is a clinic closer ot home for physical therapy   Plan: [facility] PT, HAND, AND CHIROPRACTIC REFERRAL    (R20.0,  R20.2) Numbness and tingling of both feet  -- concerned this may be due to a spinal stenosis  -- will try physical therapy to start and then will MRI back if not improving     (I25.10) Coronary artery disease involving native heart without angina pectoris, unspecified vessel or lesion type  -- encouraged to cut back on fats in  diet- pt eating eggs, 1/4 lb escalante and hash browns + toast each morning for breakfast   -- currently asymptomatic, continue to monitor   Plan: atenolol (TENORMIN) 100 MG tablet    (N40.0) Benign non-nodular prostatic hyperplasia without lower urinary tract symptoms  -- urinary symptoms worsening slightly  --pt not interested in seeing urology at this time.   Plan: finasteride (PROSCAR) 5 MG tablet, tamsulosin         (FLOMAX) 0.4 MG capsule       (E78.5) Hyperlipidemia LDL goal <130  -- stable; continue to monitor       Follow up on January 4th for hypertension follow up or as needed.     The information in this document, created by the medical scribe for me, accurately reflects the services I personally performed and the decisions made by me. I have reviewed and approved this document for accuracy prior to leaving the patient care area.  Jocelyne Boyer MD  [facility]

## 2019-01-04 ENCOUNTER — ALLIED HEALTH/NURSE VISIT (OUTPATIENT)
Dept: PEDIATRICS | Facility: CLINIC | Age: 84
End: 2019-01-04
Payer: MEDICARE

## 2019-01-04 ENCOUNTER — TELEPHONE (OUTPATIENT)
Dept: PEDIATRICS | Facility: CLINIC | Age: 84
End: 2019-01-04

## 2019-01-04 ENCOUNTER — OFFICE VISIT (OUTPATIENT)
Dept: PEDIATRICS | Facility: CLINIC | Age: 84
End: 2019-01-04
Payer: MEDICARE

## 2019-01-04 ENCOUNTER — THERAPY VISIT (OUTPATIENT)
Dept: PHYSICAL THERAPY | Facility: CLINIC | Age: 84
End: 2019-01-04
Payer: MEDICARE

## 2019-01-04 VITALS
BODY MASS INDEX: 32.74 KG/M2 | HEIGHT: 67 IN | RESPIRATION RATE: 18 BRPM | DIASTOLIC BLOOD PRESSURE: 80 MMHG | HEART RATE: 72 BPM | TEMPERATURE: 98.2 F | WEIGHT: 208.6 LBS | OXYGEN SATURATION: 96 % | SYSTOLIC BLOOD PRESSURE: 132 MMHG

## 2019-01-04 VITALS — DIASTOLIC BLOOD PRESSURE: 67 MMHG | SYSTOLIC BLOOD PRESSURE: 117 MMHG

## 2019-01-04 DIAGNOSIS — Z01.30 BP CHECK: Primary | ICD-10-CM

## 2019-01-04 DIAGNOSIS — I12.9 BENIGN HYPERTENSION WITH CKD (CHRONIC KIDNEY DISEASE) STAGE III (H): Primary | ICD-10-CM

## 2019-01-04 DIAGNOSIS — I25.10 CORONARY ARTERY DISEASE INVOLVING NATIVE HEART WITHOUT ANGINA PECTORIS, UNSPECIFIED VESSEL OR LESION TYPE: ICD-10-CM

## 2019-01-04 DIAGNOSIS — M25.552 HIP PAIN, LEFT: ICD-10-CM

## 2019-01-04 DIAGNOSIS — N18.30 BENIGN HYPERTENSION WITH CKD (CHRONIC KIDNEY DISEASE) STAGE III (H): Primary | ICD-10-CM

## 2019-01-04 PROCEDURE — 99207 ZZC NO CHARGE NURSE ONLY: CPT | Performed by: INTERNAL MEDICINE

## 2019-01-04 PROCEDURE — G8978 MOBILITY CURRENT STATUS: HCPCS | Mod: GP | Performed by: PHYSICAL THERAPIST

## 2019-01-04 PROCEDURE — 97162 PT EVAL MOD COMPLEX 30 MIN: CPT | Mod: GP | Performed by: PHYSICAL THERAPIST

## 2019-01-04 PROCEDURE — G8979 MOBILITY GOAL STATUS: HCPCS | Mod: GP | Performed by: PHYSICAL THERAPIST

## 2019-01-04 PROCEDURE — 97110 THERAPEUTIC EXERCISES: CPT | Mod: GP | Performed by: PHYSICAL THERAPIST

## 2019-01-04 PROCEDURE — 99215 OFFICE O/P EST HI 40 MIN: CPT | Performed by: INTERNAL MEDICINE

## 2019-01-04 RX ORDER — ATENOLOL 100 MG/1
100 TABLET ORAL DAILY
Qty: 90 TABLET | Refills: 3 | Status: SHIPPED | OUTPATIENT
Start: 2019-01-04

## 2019-01-04 ASSESSMENT — MIFFLIN-ST. JEOR: SCORE: 1601.89

## 2019-01-04 NOTE — PROGRESS NOTES
"  SUBJECTIVE:   Rk Aldana is a 83 year old male who presents to clinic today for the following health issues:    Hypertension Follow-up      Outpatient blood pressures are only being checked when advised to     Low Salt Diet: not monitoring salt      Amount of exercise or physical activity: None    Problems taking medications regularly: No    Medication side effects: none    Diet: regular (no restrictions)    Patient reports he has been checking blood pressures and brought in his machine. Averaging 147-171/64-87 AM and 123-175/62-88 in the PM. BP today was 132/80 and had not taken his medication. Did  a cold from his wife's nursing home and had to take decongestants which he knows could have raised his BP.     States he has sever pain in the center of his chest when coughing and \"it's not back right yet\". Needed to take some tylenol for pain. Is afraid to take cough medication. Since taking decongestant water feeling in ears has resolved.     Reports he is coughing up phlegm \"like a scab but is as big as the top of my thumb\" it is not causing him to choke sometimes and scares him.         Problem list and histories reviewed & adjusted, as indicated.  Additional history: as documented    Patient Active Problem List   Diagnosis     BILATERAL CAROTID BRUITS     B-complex deficiency     BPH (benign prostatic hyperplasia)     Diverticulosis of colon with hemorrhage     Hyperlipidemia with target LDL less than 130     Advanced directives, counseling/discussion     CAD (coronary artery disease)     Hx of adenomatous colonic polyps     Prediabetes     Back pain     GERD (gastroesophageal reflux disease)     CKD (chronic kidney disease) stage 3, GFR 30-59 ml/min (H)     Benign hypertension with CKD (chronic kidney disease) stage III (H)     Obesity (BMI 30-39.9)     Left medial knee pain     Obesity     AK (actinic keratosis)     Seborrheic keratosis     Past Surgical History:   Procedure Laterality Date     " SURGICAL HISTORY OF -   03/27/00    Colonoscopy       Social History     Tobacco Use     Smoking status: Never Smoker     Smokeless tobacco: Never Used   Substance Use Topics     Alcohol use: Yes     Comment: rarely     Family History   Problem Relation Age of Onset     Connective Tissue Disorder Mother         LUPUS     Cerebrovascular Disease Mother      Cancer - colorectal Father         70     Other Cancer Father          Current Outpatient Medications   Medication Sig Dispense Refill     Ascorbic Acid (VITAMIN C PO)        aspirin 81 MG tablet Take by mouth daily.  30 0     atenolol (TENORMIN) 100 MG tablet Take 1 tablet (100 mg) by mouth daily 30 tablet 1     celecoxib (CELEBREX) 200 MG capsule TAKE ONE CAPSULE BY MOUTH ONCE DAILY AS NEEDED FOR PAIN 90 capsule 3     CVS VITAMIN B12 1000 MCG OR TABS 1 tablet daily       fenofibrate 54 MG tablet TAKE 1 TABLET BY MOUTH ONCE DAILY 90 tablet 2     finasteride (PROSCAR) 5 MG tablet Take 1 tablet (5 mg) by mouth daily 90 tablet 3     lisinopril-hydrochlorothiazide (PRINZIDE/ZESTORETIC) 20-12.5 MG per tablet TAKE TWO TABLETS BY MOUTH ONCE DAILY 180 tablet 3     Multiple Vitamins-Minerals (ZINC PO)        multivitamin, therapeutic with minerals (THERA-VIT-M) TABS Take 1 tablet by mouth daily       naproxen sodium (ANAPROX) 220 MG tablet Take 1 tablet (220 mg) by mouth 2 times daily (with meals) 180 tablet 3     omeprazole (PRILOSEC) 40 MG capsule TAKE ONE CAPSULE BY MOUTH ONCE DAILY 90 capsule 3     rosuvastatin (CRESTOR) 40 MG tablet TAKE 1 TABLET BY MOUTH ONCE DAILY DUE  FOR  FULL  FASTING  PHYSICAL  FOR  FURTHER  REFILLS 90 tablet 3     tamsulosin (FLOMAX) 0.4 MG capsule Take 2 capsules (0.8 mg) by mouth daily 180 capsule 3     Allergies   Allergen Reactions     Penicillins      Pen-     Seasonal Allergies      BP Readings from Last 3 Encounters:   01/04/19 132/80   11/29/18 140/60   05/15/18 138/60    Wt Readings from Last 3 Encounters:   01/04/19 94.6 kg (208 lb  "9.6 oz)   11/29/18 95.3 kg (210 lb 1 oz)   05/15/18 92.4 kg (203 lb 12.8 oz)                  Labs reviewed in EPIC    Reviewed and updated as needed this visit by clinical staff  Tobacco  Allergies  Meds  Med Hx  Surg Hx  Fam Hx  Soc Hx      Reviewed and updated as needed this visit by Provider         ROS:  Constitutional, HEENT, cardiovascular, pulmonary, GI, , musculoskeletal, neuro, skin, endocrine and psych systems are negative, except as otherwise noted.    This document serves as a record of the services and decisions personally performed and made by Jocelyne Boyer MD. It was created on her behalf by Sri Jenkins, a trained medical scribe. The creation of this document is based the provider's statements to the medical scribe.    Sri Jenkins January 4, 2019 9:10 AM  OBJECTIVE:     /80 (BP Location: Right arm, Patient Position: Chair, Cuff Size: Adult Large)   Pulse 72   Temp 98.2  F (36.8  C) (Oral)   Resp 18   Ht 1.689 m (5' 6.5\")   Wt 94.6 kg (208 lb 9.6 oz)   SpO2 96%   BMI 33.16 kg/m    Body mass index is 33.16 kg/m .  GENERAL:  alert and no distress  HENT: ear canals and TM's normal, nose and mouth without ulcers or lesions  RESP: lungs clear to auscultation - no rales, rhonchi or wheezes  CV: regular rate and rhythm, normal S1 S2, no S3 or S4, no murmur, click or rub, no peripheral edema  ABDOMEN: soft, nontender,  and bowel sounds normal  PSYCH: mentation appears normal, affect normal/bright    Diagnostic Test Results:  none     ASSESSMENT/PLAN:   9:09-9:50    I spent 41 min face to face with patient over 50% in counseling on issues as detailed below.      (I12.9,  N18.3) Benign hypertension with CKD (chronic kidney disease) stage III (H)  (primary encounter diagnosis)  -- /80 in clinic without medications but BP readings on machine are much higher   -- suspect blood pressure cuff may be inaccurate; do not want to change medications until I have accurate blood " pressure medications   -- advised patient to stop at pharmacy and have BP cuff checked for accuracy; can do at non [facility] if convenient   -- If BP are still too high will add in additional medication   --currently on max dose of lisinopri/hctz and atenolol; would add amlodipine 5mg next if needed; reviewed potential side effects with patient.    Plan: atenolol (TENORMIN) 100 MG tablet      (I25.10) Coronary artery disease involving native heart without angina pectoris, unspecified vessel or lesion type  -- see above discussion   -- discussed with patient BP medications and control are important to reduce heart attack and stroke risk   Plan: atenolol (TENORMIN) 100 MG tablet      Follow up on March 14th for physical exam or as needed.         The information in this document, created by the medical scribe for me, accurately reflects the services I personally performed and the decisions made by me. I have reviewed and approved this document for accuracy prior to leaving the patient care area.  Jocelyne Boyer MD  [facility]

## 2019-01-04 NOTE — PATIENT INSTRUCTIONS
Bring machine with the cuff into the pharmacy and they will see if it is accurate.    Try humidifier in the home to help with dry air. You could also try over the counter saltwater nasal spray.      If you notice increased swelling of feet or ankles then let me know.     Set up appointment with physical therapy.     Can talk with NP or PA about compression stockings for Abi.     You will need to be fasting on 3/14 physical appointment.

## 2019-01-04 NOTE — PROGRESS NOTES
Rk Aldana is enrolled/participating in the retail pharmacy Blood Pressure Goals Achievement Program (BPGAP).  Rk Aldana was evaluated at [facility] on January 4, 2019 at which time his blood pressure was:    BP Readings from Last 3 Encounters:   01/04/19 117/67   01/04/19 132/80   11/29/18 140/60     Reviewed lifestyle modifications for blood pressure control and reduction: including making healthy food choices, managing weight, getting regular exercise, smoking cessation, reducing alcohol consumption, monitoring blood pressure regularly.     Rk Aldana is not experiencing symptoms.    Follow-Up: BP is at goal of < 150/90 mmHg (patient 60+ years of age without diabetes).  Recommended follow-up at pharmacy in 6 months.     Recommendation to Provider: n/a    Rk Aldana was evaluated for enrollment into the PGEN study today.    PLEASE INITIATE ENROLLMENT DISCUSSION WITH HTN PTS  1) Between 30-80 years old                                                                                                               2) BMI between 19-50                                                                                                        3) BP ?140/90 AND ?170/110 patients aged 30-59         BP ?150/90 AND ?170/110 non-diabetic patients aged 60-80       BP ?140/90 AND ?170/110 diabetic patients aged 60-80  4) Additional requirements for uncontrolled HTN patients:        Pt on only 1 class of medication  5) EXCLUDE patient if confirmation of:                  ? Cardiac disease                  ? Chronic Kidney Disease                  ? Pregnancy/Breastfeeding                  ? Secondary Hypertension/Pre-eclampsia                                 ? Vascular disease    Patient eligible for enrollment:  No  Patient interested in enrollment:  No    This note completed by: Thanks,  Annabelle Valenzuela, PharmD  [facility]

## 2019-01-04 NOTE — LETTER
"DEPARTMENT OF HEALTH AND HUMAN SERVICES  CENTERS FOR MEDICARE   & MEDICAID SERVICES    PLAN/UPDATED PLAN OF PROGRESS FOR OUTPATIENT REHABILITATION    PATIENTS NAME:  Rk Aldana   : 1935  PROVIDER NUMBER:    [id number]  HICN:  [id number]   PROVIDER NAME: [facility]  MEDICAL RECORD NUMBER: [id number]   START OF CARE DATE:  SOC Date: 19   TYPE:  PT  PRIMARY/TREATMENT DIAGNOSIS: (Pertinent Medical Diagnosis)  Hip pain, left  VISITS FROM START OF CARE: 1 Rxs Used: 1     [facility] Initial Evaluation    Subjective:  The history is provided by the patient. No  was used.   Rk Aldaan is a 83 year old male with a left hip condition.      This is a chronic condition  Pt describes that he developed R knee and calf pain several years ago and then that went away on its own.  Sometime after that pt developed L leg pain, this was many years ago, pt is not able to give an exact date but sometime on or around 2013.  Symptoms seem to be variable. Pt describes that his symptoms have been present ever since that time.  Pt gets worse with walking, seems to be limited to walking for two minutes and then his pain increases.  Pt has dificulty standing for a few minutes at a time..    Site of Pain: L hip.  Radiates to:  Foot, lower leg, thigh, hip and knee (diminished sensation).  Pain is described as aching (\"makes me limp\") and is intermittent and reported as 4/10.  Associated symptoms:  Numbness and tingling. Pain is the same all the time.  Symptoms are exacerbated by walking and standing Relieved by: sitting.  Since onset symptoms are gradually worsening.    Previous treatment includes physical therapy.    General health as reported by patient is excellent.  Pertinent medical history includes:  High blood pressure, incontinence, numbness/tingling and overweight.        Current occupation is Retired  Barriers include:  None as reported by the " patient.  Red flags:  None as reported by the patient.  Objective:  System  Hip Evaluation  Hip Strength:    Flexion:   Left: 4+/5   Pain:  Right: 4+/5   Pain:  Extension:  Left: 4/5  Pain:Right: 4+/5    Pain:    Abduction:  Left: 4-/5     Pain:Right: 4/5    Pain:  Eamon Lumbar Evaluation  Posture:  Sitting: fair  Standing: fair  Lordosis: Reduced  Lateral Shift: no  Correction of Posture: no effect  Movement Loss:  PATIENTS NAME:  Rk Aldana   : 1935    Flexion (Flex): min  Extension (EXT): mod and pain  Side [place] R (SG R): nil  Side Glide L (SG L): min and pain  Test Movements:  FIS:   Repeat FIS: During: no effect  After: no effect  Mechanical Response: no effect  EIS:   Repeat EIS: During: produces and peripheralizing  After: worse  Mechanical Response: no effect  SGIS L:   Repeat SGIS L: During: produces  After: no worse  Mechanical Response: no effect  Conclusion: other  Principle of Treatment:  Other: testing lateral first as ext seemed to worsen  Assessment/Plan:    Patient is a 83 year old male with lumbar and left side hip complaints.    Patient has the following significant findings with corresponding treatment plan.                Diagnosis 1:  L hip and leg pain  Pain -  hot/cold therapy, manual therapy, self management, education, directional preference exercise and home program  Decreased ROM/flexibility - manual therapy and therapeutic exercise  Decreased strength - therapeutic exercise and therapeutic activities  Impaired gait - gait training  Impaired muscle performance - neuro re-education  Decreased function - therapeutic activities  Impaired posture - neuro re-education  Therapy Evaluation Codes:   1) History comprised of:   Personal factors that impact the plan of care:      Age, Past/current experiences and Time since onset of symptoms.    Comorbidity factors that impact the plan of care are:      High blood pressure, Numbness/tingling, Overweight and Pain at night/rest.   "   Medications impacting care: Cardiac, High blood pressure and Pain.  2) Examination of Body Systems comprised of:   Body structures and functions that impact the plan of care:      Ankle, Hip, Knee, Lumbar spine, Pelvis and Sacral illiac joint.   Activity limitations that impact the plan of care are:      Bathing, Stairs, Standing and Walking.  3) Clinical presentation characteristics are:   Evolving/Changing.  4) Decision-Making    Moderate complexity using standardized patient assessment instrument and/or measureable assessment of functional outcome.  Cumulative Therapy Evaluation is: Moderate complexity.  Previous and current functional limitations:  (See Goal Flow Sheet for this information)    Short term and Long term goals: (See Goal Flow Sheet for this information)   Communication ability:  Patient appears to be able to clearly communicate and understand verbal and written communication and follow directions correctly.  Treatment Explanation - The following has been discussed with the patient:   RX  PATIENTS NAME:  Rk Aldana   : 1935    ordered/plan of care  Anticipated outcomes  Possible risks and side effects  This patient would benefit from PT intervention to resume normal activities.   Rehab potential is fair.  Frequency:  1 X week, once daily  Duration:  for 6 weeks  Discharge Plan:  Achieve all LTG.  Independent in home treatment program.  Reach maximal therapeutic benefit.      Caregiver Signature/Credentials _____________________________ Date ________            SONIA De Santiago  I have reviewed and certified the need for these services and plan of treatment while under my care.        PHYSICIAN'S SIGNATURE:   _____________________________________ Date___________                        Jocelyne Boyer MD    Certification period:  Beginning of Cert date period: 19 to  End of Cert period date: 19   Functional Level Progress Report: Please see attached \"Goal Flow sheet " "for Functional level.\"  ____X____ Continue Services or       ________ DC Services              Service dates: From  SOC Date: 01/04/19 date to present                         "

## 2019-01-04 NOTE — TELEPHONE ENCOUNTER
Reason for Call:  Other Prescription and BP check from visit    Detailed comments:   1. BP check at pharmacy coralates with the one today at visit.  2. Please send script for atenolol, script has already been sent.    Phone Number Patient can be reached at: Home number on file [phone] (home)    Best Time: any    Can we leave a detailed message on this number? YES    Call taken on 1/4/2019 at 12:02 PM by Enma Tobar

## 2019-01-04 NOTE — LETTER
"DEPARTMENT OF HEALTH AND HUMAN SERVICES  CENTERS FOR MEDICARE & MEDICAID SERVICES    PLAN/UPDATED PLAN OF PROGRESS FOR OUTPATIENT REHABILITATION    PATIENTS NAME:  Rk Aldana   : 1935  PROVIDER NUMBER:    [id number]  HICN:  [id number]  PROVIDER NAME: [facility]  MEDICAL RECORD NUMBER: [id number]   START OF CARE DATE:  SOC Date: 19   TYPE:  PT  PRIMARY/TREATMENT DIAGNOSIS: (Pertinent Medical Diagnosis)  Hip pain, left  VISITS FROM START OF CARE: 1  Rxs Used: 1     [employer] [facility] Initial Evaluation    Subjective:  Date of MD appt: 2018  The history is provided by the patient. No  was used.   Rk Aldana is a 83 year old male with a left hip condition.      This is a chronic condition  Pt describes that he developed R knee and calf pain several years ago and then that went away on its own.  Sometime after that pt developed L leg pain, this was many years ago, pt is not able to give an exact date but sometime on or around 2013.  Symptoms seem to be variable. Pt describes that his symptoms have been present ever since that time.  Pt gets worse with walking, seems to be limited to walking for two minutes and then his pain increases.  Pt has dificulty standing for a few minutes at a time..    Site of Pain: L hip.  Radiates to:  Foot, lower leg, thigh, hip and knee (diminished sensation).  Pain is described as aching (\"makes me limp\") and is intermittent and reported as 4/10.  Associated symptoms:  Numbness and tingling. Pain is the same all the time.  Symptoms are exacerbated by walking and standing Relieved by: sitting.  Since onset symptoms are gradually worsening.    Previous treatment includes physical therapy.    General health as reported by patient is excellent.  Pertinent medical history includes:  High blood pressure, incontinence, numbness/tingling and overweight.        Current occupation is Retired  Barriers " include:  None as reported by the patient.  Red flags:  None as reported by the patient.  Objective:  System  Hip Evaluation  Hip Strength:    Flexion:   Left: 4+/5   Pain:  Right: 4+/5   Pain:  Extension:  Left: 4/5  Pain:Right: 4+/5    Pain:    Abduction:  Left: 4-/5     Pain:Right: 4/5    Pain:  Eamon Lumbar Evaluation  Posture:  Sitting: fair  Standing: fair  Lordosis: Reduced  Lateral Shift: no  Correction of Posture: no effect  PATIENTS NAME:  Rk Aldana   : 1935    Movement Loss:  Flexion (Flex): min  Extension (EXT): mod and pain  Side [place] R (SG R): nil  Side Glide L (SG L): min and pain  Test Movements:  FIS:   Repeat FIS: During: no effect  After: no effect  Mechanical Response: no effect  EIS:   Repeat EIS: During: produces and peripheralizing  After: worse  Mechanical Response: no effect  SGIS L:   Repeat SGIS L: During: produces  After: no worse  Mechanical Response: no effect  Conclusion: other  Principle of Treatment:  Other: testing lateral first as ext seemed to worsen  Assessment/Plan:    Patient is a 83 year old male with lumbar and left side hip complaints.    Patient has the following significant findings with corresponding treatment plan.                Diagnosis 1:  L hip and leg pain  Pain -  hot/cold therapy, manual therapy, self management, education, directional preference exercise and home program  Decreased ROM/flexibility - manual therapy and therapeutic exercise  Decreased strength - therapeutic exercise and therapeutic activities  Impaired gait - gait training  Impaired muscle performance - neuro re-education  Decreased function - therapeutic activities  Impaired posture - neuro re-education  Therapy Evaluation Codes:   1) History comprised of:   Personal factors that impact the plan of care:      Age, Past/current experiences and Time since onset of symptoms.    Comorbidity factors that impact the plan of care are:      High blood pressure, Numbness/tingling,  Overweight and Pain at night/rest.     Medications impacting care: Cardiac, High blood pressure and Pain.  2) Examination of Body Systems comprised of:   Body structures and functions that impact the plan of care:      Ankle, Hip, Knee, Lumbar spine, Pelvis and Sacral illiac joint.   Activity limitations that impact the plan of care are:      Bathing, Stairs, Standing and Walking.  3) Clinical presentation characteristics are:   Evolving/Changing.  4) Decision-Making    Moderate complexity using standardized patient assessment instrument and/or measureable assessment of functional outcome.  Cumulative Therapy Evaluation is: Moderate complexity.  Previous and current functional limitations:  (See Goal Flow Sheet for this information)    Short term and Long term goals: (See Goal Flow Sheet for this information)   Communication ability:  Patient appears to be able to clearly communicate and understand verbal and written communication and follow directions correctly.  PATIENTS NAME:  Rk Aldana   : 1935    Treatment Explanation - The following has been discussed with the patient:   RX ordered/plan of care  Anticipated outcomes  Possible risks and side effects  This patient would benefit from PT intervention to resume normal activities.   Rehab potential is fair.  Frequency:  1 X week, once daily  Duration:  for 6 weeks  Discharge Plan:  Achieve all LTG.  Independent in home treatment program.  Reach maximal therapeutic benefit.        Caregiver Signature/Credentials _____________________________ Date ________               SONIA De Santiago    I have reviewed and certified the need for these services and plan of treatment while under my care.        PHYSICIAN'S SIGNATURE:   _____________________________________  Date___________                         Jocelyne Boyer MD    Certification period:  Beginning of Cert date period: 19 to  End of Cert period date: 19     Functional Level Progress  "Report: Please see attached \"Goal Flow sheet for Functional level.\"    ____X____ Continue Services or       ________ DC Services                Service dates: From  SOC Date: 01/04/19 date to present                         "

## 2019-01-04 NOTE — PROGRESS NOTES
"[facility] Initial Evaluation  Subjective:  Date of MD appt: 11/29/2018      The history is provided by the patient. No  was used.   Rk Aldana is a 83 year old male with a left hip condition.      This is a chronic condition  Pt describes that he developed R knee and calf pain several years ago and then that went away on its own.  Sometime after that pt developed L leg pain, this was many years ago, pt is not able to give an exact date but sometime on or around 1/1/2013.  Symptoms seem to be variable. Pt describes that his symptoms have been present ever since that time.  Pt gets worse with walking, seems to be limited to walking for two minutes and then his pain increases.  Pt has dificulty standing for a few minutes at a time..    Site of Pain: L hip.  Radiates to:  Foot, lower leg, thigh, hip and knee (diminished sensation).  Pain is described as aching (\"makes me limp\") and is intermittent and reported as 4/10.  Associated symptoms:  Numbness and tingling. Pain is the same all the time.  Symptoms are exacerbated by walking and standing Relieved by: sitting.  Since onset symptoms are gradually worsening.    Previous treatment includes physical therapy.    General health as reported by patient is excellent.  Pertinent medical history includes:  High blood pressure, incontinence, numbness/tingling and overweight.        Current occupation is Retired  .        Barriers include:  None as reported by the patient.    Red flags:  None as reported by the patient.                        Objective:  System                                           Hip Evaluation    Hip Strength:    Flexion:   Left: 4+/5   Pain:  Right: 4+/5   Pain:                    Extension:  Left: 4/5  Pain:Right: 4+/5    Pain:    Abduction:  Left: 4-/5     Pain:Right: 4/5    Pain:                                   Eamon Lumbar Evaluation    Posture:  Sitting: fair  Standing: fair  Lordosis: " Reduced  Lateral Shift: no  Correction of Posture: no effect    Movement Loss:  Flexion (Flex): min  Extension (EXT): mod and pain  Side Cameron R (SG R): nil  Side Glide L (SG L): min and pain  Test Movements:  FIS:   Repeat FIS: During: no effect  After: no effect  Mechanical Response: no effect  EIS:   Repeat EIS: During: produces and peripheralizing  After: worse  Mechanical Response: no effect        SGIS L:   Repeat SGIS L: During: produces  After: no worse  Mechanical Response: no effect    Conclusion: other  Principle of Treatment:          Other: testing lateral first as ext seemed to worsen                                       ROS    Assessment/Plan:    Patient is a 83 year old male with lumbar and left side hip complaints.    Patient has the following significant findings with corresponding treatment plan.                Diagnosis 1:  L hip and leg pain      Pain -  hot/cold therapy, manual therapy, self management, education, directional preference exercise and home program  Decreased ROM/flexibility - manual therapy and therapeutic exercise  Decreased strength - therapeutic exercise and therapeutic activities  Impaired gait - gait training  Impaired muscle performance - neuro re-education  Decreased function - therapeutic activities  Impaired posture - neuro re-education    Therapy Evaluation Codes:   1) History comprised of:   Personal factors that impact the plan of care:      Age, Past/current experiences and Time since onset of symptoms.    Comorbidity factors that impact the plan of care are:      High blood pressure, Numbness/tingling, Overweight and Pain at night/rest.     Medications impacting care: Cardiac, High blood pressure and Pain.  2) Examination of Body Systems comprised of:   Body structures and functions that impact the plan of care:      Ankle, Hip, Knee, Lumbar spine, Pelvis and Sacral illiac joint.   Activity limitations that impact the plan of care are:      Bathing, Stairs,  Standing and Walking.  3) Clinical presentation characteristics are:   Evolving/Changing.  4) Decision-Making    Moderate complexity using standardized patient assessment instrument and/or measureable assessment of functional outcome.  Cumulative Therapy Evaluation is: Moderate complexity.    Previous and current functional limitations:  (See Goal Flow Sheet for this information)    Short term and Long term goals: (See Goal Flow Sheet for this information)     Communication ability:  Patient appears to be able to clearly communicate and understand verbal and written communication and follow directions correctly.  Treatment Explanation - The following has been discussed with the patient:   RX ordered/plan of care  Anticipated outcomes  Possible risks and side effects  This patient would benefit from PT intervention to resume normal activities.   Rehab potential is fair.    Frequency:  1 X week, once daily  Duration:  for 6 weeks  Discharge Plan:  Achieve all LTG.  Independent in home treatment program.  Reach maximal therapeutic benefit.    Please refer to the daily flowsheet for treatment today, total treatment time and time spent performing 1:1 timed codes.

## 2019-01-11 ENCOUNTER — THERAPY VISIT (OUTPATIENT)
Dept: PHYSICAL THERAPY | Facility: CLINIC | Age: 84
End: 2019-01-11
Payer: MEDICARE

## 2019-01-11 DIAGNOSIS — M25.552 HIP PAIN, LEFT: ICD-10-CM

## 2019-01-11 PROCEDURE — 97112 NEUROMUSCULAR REEDUCATION: CPT | Mod: GP | Performed by: PHYSICAL THERAPIST

## 2019-01-11 PROCEDURE — 97110 THERAPEUTIC EXERCISES: CPT | Mod: GP | Performed by: PHYSICAL THERAPIST

## 2019-01-18 ENCOUNTER — THERAPY VISIT (OUTPATIENT)
Dept: PHYSICAL THERAPY | Facility: CLINIC | Age: 84
End: 2019-01-18
Payer: MEDICARE

## 2019-01-18 DIAGNOSIS — M25.552 HIP PAIN, LEFT: ICD-10-CM

## 2019-01-18 PROCEDURE — 97112 NEUROMUSCULAR REEDUCATION: CPT | Mod: GP | Performed by: PHYSICAL THERAPIST

## 2019-01-18 PROCEDURE — 97110 THERAPEUTIC EXERCISES: CPT | Mod: GP | Performed by: PHYSICAL THERAPIST

## 2019-01-20 ENCOUNTER — TELEPHONE (OUTPATIENT)
Dept: PEDIATRICS | Facility: CLINIC | Age: 84
End: 2019-01-20

## 2019-01-20 NOTE — TELEPHONE ENCOUNTER
Please see unread mychart to patient about blood pressure cuff.  Please call him with information in mychart message.  Jocelyne Boyer MD

## 2019-01-22 NOTE — TELEPHONE ENCOUNTER
Pt states that he had his machine checked out at the pharmacy. Pt thinks that it was a user error. He checked his on his machine at pharmacy and then checked it on the pharmacy one and they bother were the same. He does not know if he has been getting good readings since then though  because he not been checking them. Would you like pt to check them? Please advise.

## 2019-01-24 NOTE — TELEPHONE ENCOUNTER
Called and relayed message to estella. He will call back in about 2 wks to let us know his readings.     Also pt wanted his PT appointment cancelled for tomorrow,  this was cancelled for him.

## 2019-01-29 ENCOUNTER — TELEPHONE (OUTPATIENT)
Dept: PEDIATRICS | Facility: CLINIC | Age: 84
End: 2019-01-29

## 2019-01-29 RX ORDER — LATANOPROST 50 UG/ML
1 SOLUTION/ DROPS OPHTHALMIC DAILY
Qty: 4.5 ML | Refills: 3 | COMMUNITY
Start: 2019-01-29

## 2019-01-29 NOTE — TELEPHONE ENCOUNTER
Patient saw Opthamologist and was told he has glaucoma and began two medications.  Medication list updated.  PHUC Livingston RN

## 2019-02-20 ENCOUNTER — OFFICE VISIT (OUTPATIENT)
Dept: PEDIATRICS | Facility: CLINIC | Age: 84
End: 2019-02-20
Payer: MEDICARE

## 2019-02-20 ENCOUNTER — TELEPHONE (OUTPATIENT)
Dept: PEDIATRICS | Facility: CLINIC | Age: 84
End: 2019-02-20

## 2019-02-20 VITALS
BODY MASS INDEX: 31.71 KG/M2 | WEIGHT: 202 LBS | HEART RATE: 64 BPM | SYSTOLIC BLOOD PRESSURE: 138 MMHG | TEMPERATURE: 97.9 F | OXYGEN SATURATION: 97 % | HEIGHT: 67 IN | DIASTOLIC BLOOD PRESSURE: 66 MMHG

## 2019-02-20 DIAGNOSIS — R06.2 WHEEZING: ICD-10-CM

## 2019-02-20 DIAGNOSIS — J18.9 PNEUMONIA OF BOTH LOWER LOBES DUE TO INFECTIOUS ORGANISM: Primary | ICD-10-CM

## 2019-02-20 PROCEDURE — 99214 OFFICE O/P EST MOD 30 MIN: CPT | Performed by: PHYSICIAN ASSISTANT

## 2019-02-20 RX ORDER — AZITHROMYCIN 250 MG/1
TABLET, FILM COATED ORAL
Qty: 6 TABLET | Refills: 0 | Status: SHIPPED | OUTPATIENT
Start: 2019-02-20 | End: 2019-02-25

## 2019-02-20 RX ORDER — ALBUTEROL SULFATE 90 UG/1
2 AEROSOL, METERED RESPIRATORY (INHALATION) EVERY 6 HOURS
Qty: 1 INHALER | Refills: 0 | Status: SHIPPED | OUTPATIENT
Start: 2019-02-20 | End: 2019-09-13

## 2019-02-20 ASSESSMENT — MIFFLIN-ST. JEOR: SCORE: 1571.96

## 2019-02-20 NOTE — TELEPHONE ENCOUNTER
Please call patient at [phone]  He stated he wanted [facility] nurse to call him and he may need an appt. Today or tomorrow  Or possibly clear up with a phone call but not willing to discuss what it was regarding to anyone else.

## 2019-02-20 NOTE — TELEPHONE ENCOUNTER
Patient has a cough productive of green phlegm  Hoarse.  Nasal discharge that is colored also.  No dizziness.  Fatigue.  Has had symptoms for two weeks.  No fever.    Temp 97.  O2 sat-96%  Denies shortness of breath or wheezing  Due to length of symptoms, appointment recommended and scheduled today.  No further questions.  Will call back if any other questions or concerns.  PHUC Livingston RN

## 2019-02-20 NOTE — PATIENT INSTRUCTIONS
Pneumonia      What is pneumonia?   Pneumonia is an infection and inflammation of the lungs.  How does it occur?   Pneumonia occurs when the lungs are exposed to germs not usually present in the lungs. Your lungs may have become infected because:  You were exposed to a large amount of a virus or bacteria.   Your immune system was worn down because you were already ill--for example, with the flu.   You have another illness, such as diabetes, chronic bronchitis, or cancer. The illness can make it easier for you to get all kinds of infections. This is why so many older adults develop pneumonia. Also, an underlying illness may make it difficult to survive a bout of pneumonia.   You breathed in (aspirated) stomach contents. Aspiration pneumonia occurs when stomach contents back up into the esophagus and trachea. From there what was in your stomach can be breathed into the lungs. This can happen, for example, when you choke on food. It is especially common with illnesses or conditions that cause swallowing problems, such as a stroke. The bacteria that normally live in the mouth can cause pneumonia if breathed into the lungs.   You have recently had surgery, especially if you had general anesthesia.  What are the symptoms?   Common symptoms of pneumonia are:  fever and chills   cough   shortness of breath   chest pain, especially when you take a breath   coughing up mucus, sometimes blood-stained   muscle aches.  Not all pneumonias cause a high fever. The only symptom may be several days or weeks of dry cough, often with extreme tiredness. In the case of older adults, the only early sign of pneumonia may be confusion or a decrease in physical activity.  How is it diagnosed?   Your healthcare provider will review your symptoms and examine you. Your provider will check for fever and breathing problems. He or she will listen to your lungs.  The following tests help detect pneumonia:  chest X-ray   blood tests    lab tests of a sputum sample (a sample of mucus, also called phlegm, coughed up from deep in your lungs).  How is it treated?   Your healthcare provider will determine what medicine you need. Most often you will be given antibiotics and instructions for caring for yourself at home.  If your pneumonia is caused by a virus, antibiotics will not kill the virus. However, your provider may start you on antibiotics because it is often not possible to know if pneumonia is caused by bacteria or a virus. If your symptoms are very mild, you may need only rest, fluids, and observation, especially if many others in your community are having viral pneumonia.  You may need to stay in the hospital if:  You are having a lot of trouble breathing.   It's hard for you to drink enough fluids.   You have no one to care for you at home.  If you are hospitalized:  You may be given oxygen.   You may be given IV (intravenous) fluids.   You will be checked often by nursing staff. You may have electronic monitors to keep track of your pulse and oxygen levels.   You may have X-rays taken several times during your stay.  If you live in a nursing home and develop pneumonia, you can stay in the nursing home to recover if:  You will be able to have lab tests and X-rays at the nursing home.   There are qualified healthcare professionals at the nursing home to care for you.   You can be treated with oral medicine or shots, or, if you need IV antibiotics, the nursing home is able to give IV medicine.  If you are well enough to go home, but need shots or IV antibiotics, a home health service may come 1 or more times a day to give your medicine.   How long will the effects last?  If the pneumonia is caused by bacteria, usually you will begin to feel better 2 to 3 days after you start taking antibiotics. If you are an otherwise healthy person, you should feel close to normal after a week or so. If you are over 60 years old or have other medical  problems, it may take longer to get back to your usual strength and energy.  If your pneumonia is caused by a virus, you should feel better in just a few days, depending on your overall health.  How can I take care of myself?   Start taking your medicine right away and follow the treatment your healthcare provider prescribes.   Rest until you no longer have a fever, chest pain, or shortness of breath. Follow your healthcare provider's instructions for returning to activities such as school, work, or recreation.   Drink more liquids (water or tea) every day to help you cough up mucus more easily unless your provider says you need to limit fluids.   Cough up lung secretions as much as possible. Use cough medicine only if your provider recommends that you take it.   Use a humidifier to put more moisture in the air. Avoid steam vaporizers because they can cause burns. Be sure to keep the humidifier clean, as recommended in the 's instructions. It's important to keep bacteria and fungi from growing in the water container.   Ask your provider about taking aspirin, ibuprofen, or acetaminophen for fever or chest pain.   Nonsteroidal anti-inflammatory medicines (NSAIDs), such as ibuprofen and aspirin, may cause stomach bleeding and other problems. These risks increase with age. Read the label and take as directed. Unless recommended by your healthcare provider, do not take for more than 10 days for any reason.   Check with your healthcare provider before you give any medicine that contains aspirin or salicylates to a child or teen. This includes medicines like baby aspirin, some cold medicines, and Pepto Bismol. Children and teens who take aspirin are at risk for a serious illness called Reye's syndrome.   Use a heating pad on a low setting to reduce chest pain. Be careful not to fall asleep while you are using the heating pad. This can cause burns.   Don't smoke or drink alcohol while you are recovering from  pneumonia.   Call your healthcare provider if you feel you are getting worse or if you are not getting better within 2 to 3 days after you start treatment.  How can I help prevent pneumonia?   Don't smoke.   Get a flu shot every October to protect against the flu.   Ask your healthcare provider if you should get the pneumonia shot (Pneumovax). Adults over the age of 65 should have a second dose if they had the shot before they were 65 and it has been more than 5 years since the first shot.   Eat a healthy diet.   Exercise regularly according to your healthcare provider's advice.    Published by [employer].  This content is reviewed periodically and is subject to change as new health information becomes available. The information is intended to inform and educate and is not a replacement for medical evaluation, advice, diagnosis or treatment by a healthcare professional.  Developed by [employer].    2011 [employer] and/or its affiliates. All rights reserved.

## 2019-02-25 ENCOUNTER — TELEPHONE (OUTPATIENT)
Dept: PEDIATRICS | Facility: CLINIC | Age: 84
End: 2019-02-25

## 2019-02-25 NOTE — TELEPHONE ENCOUNTER
Patient had office appointment on 02/20.  Was started on Z-pack and an inhaler  Completed antibiotic on Sunday.  Using Inhaler every 6 hrs-doesn't notice any difference after using this.  Patient states that he continues to have cough productive of yellow secretions.  Had been productive of green secretions before.  No fever.  Fatigue has improved.  O2 sat 94%  No temp.  No Shortness of breath.  Advised patient that if he notes shortness of breath, wheezing of O2 sat at 90%, he should be seen.  Otherwise per office appointment notes, he should recheck on 02/27, if symptoms not much improved    I asked patient to walk around his house with the oximeter on-notes he does have some shortness of breath with walking.  No chest pain while walking.  No increased swelling in his lower extremities.  O2 sat was 91-93% while walking.  Appointment scheduled tomorrow for re-evaluation due to shortness of breath with ambulation.  Patient in agreement and understands if symptoms discussed above occur, he will need to be seen today.  No further questions.  Will call back if any other questions or concerns..  PHUC Livingston RN

## 2019-02-25 NOTE — TELEPHONE ENCOUNTER
Reason for call:  Other   Patient called regarding (reason for call): call back  Additional comments: Patient continues to have symptoms. Cough, no fever.  Would like to discuss. Please call.     Phone number to reach patient:  Cell number on file:    Telephone Information:   Mobile [phone]       Best Time: any    Can we leave a detailed message on this number?  NO

## 2019-02-26 ENCOUNTER — OFFICE VISIT (OUTPATIENT)
Dept: PEDIATRICS | Facility: CLINIC | Age: 84
End: 2019-02-26
Payer: MEDICARE

## 2019-02-26 VITALS
BODY MASS INDEX: 33.27 KG/M2 | HEART RATE: 72 BPM | WEIGHT: 212 LBS | DIASTOLIC BLOOD PRESSURE: 62 MMHG | OXYGEN SATURATION: 98 % | RESPIRATION RATE: 16 BRPM | TEMPERATURE: 98.7 F | HEIGHT: 67 IN | SYSTOLIC BLOOD PRESSURE: 128 MMHG

## 2019-02-26 DIAGNOSIS — J01.10 ACUTE NON-RECURRENT FRONTAL SINUSITIS: Primary | ICD-10-CM

## 2019-02-26 PROCEDURE — 99213 OFFICE O/P EST LOW 20 MIN: CPT | Performed by: INTERNAL MEDICINE

## 2019-02-26 RX ORDER — CEFDINIR 300 MG/1
300 CAPSULE ORAL 2 TIMES DAILY
Qty: 20 CAPSULE | Refills: 0 | Status: SHIPPED | OUTPATIENT
Start: 2019-02-26 | End: 2019-03-14

## 2019-02-26 RX ORDER — CEFDINIR 300 MG/1
300 CAPSULE ORAL 2 TIMES DAILY
Qty: 20 CAPSULE | Refills: 0 | Status: SHIPPED | OUTPATIENT
Start: 2019-02-26 | End: 2019-02-26

## 2019-02-26 ASSESSMENT — MIFFLIN-ST. JEOR: SCORE: 1617.32

## 2019-02-26 NOTE — PATIENT INSTRUCTIONS
Start on the cefdinir. If you are more short of breath I need to hear from you. If you cannot lie down flat because you are too short of breath I need to know.     Follow up on 3/14.

## 2019-02-26 NOTE — PROGRESS NOTES
"  SUBJECTIVE:   Rk Aldana is a 83 year old male who presents to clinic today for the following health issues:      Patient was seen in clinic on 2/20 for bilateral pneumonia dx clinically. Was placed on zpack, albuterol prn and to push fluids. Reviewed red flag sx to go in for emergent evaluation.     Here to f/u on pneumonia, still not better, still has productive cough, some congestion, ear pain. Patient reports his sputum has changed from green to yellow. States his oxygen is running in the 91-93 range. Expects to feel better and is not. Has had sx for over 1 month. Is not running fevers, denies sweats but has chills. Has been wheezing after activity; after brining up laundry from the basement it is hard for him to breathe. He is coughing a lot. Noticed on his bottom R rib he has pain. Denies blood/brown phlegm. Cough keeps him up at night. If he sits for a while in chair with legs up he has a \"feeling\" in chest. Denies LE edema. Is able to sleep flat, not waking up gasping for air.     L ear was symptomatic 2 weeks ago and R ear was symptomatic 1 week ago. Is getting a lot of thick mucus out when he blows nose. Thought maybe there was some pressure in sinuses this morning.     Problem list and histories reviewed & adjusted, as indicated.  Additional history: as documented    Patient Active Problem List   Diagnosis     BILATERAL CAROTID BRUITS     B-complex deficiency     BPH (benign prostatic hyperplasia)     Diverticulosis of colon with hemorrhage     Hyperlipidemia with target LDL less than 130     Advanced directives, counseling/discussion     CAD (coronary artery disease)     Hx of adenomatous colonic polyps     Prediabetes     Back pain     GERD (gastroesophageal reflux disease)     CKD (chronic kidney disease) stage 3, GFR 30-59 ml/min (H)     Benign hypertension with CKD (chronic kidney disease) stage III (H)     Obesity (BMI 30-39.9)     Left medial knee pain     Obesity     AK (actinic keratosis) "     Seborrheic keratosis     Hip pain, left     Past Surgical History:   Procedure Laterality Date     SURGICAL HISTORY OF -   03/27/00    Colonoscopy       Social History     Tobacco Use     Smoking status: Never Smoker     Smokeless tobacco: Never Used   Substance Use Topics     Alcohol use: Yes     Comment: rarely     Family History   Problem Relation Age of Onset     Connective Tissue Disorder Mother         LUPUS     Cerebrovascular Disease Mother      Cancer - colorectal Father         70     Other Cancer Father          Current Outpatient Medications   Medication Sig Dispense Refill     albuterol (PROAIR HFA/PROVENTIL HFA/VENTOLIN HFA) 108 (90 Base) MCG/ACT inhaler Inhale 2 puffs into the lungs every 6 hours 1 Inhaler 0     Ascorbic Acid (VITAMIN C PO)        aspirin 81 MG tablet Take by mouth daily.  30 0     atenolol (TENORMIN) 100 MG tablet Take 1 tablet (100 mg) by mouth daily 90 tablet 3     celecoxib (CELEBREX) 200 MG capsule TAKE ONE CAPSULE BY MOUTH ONCE DAILY AS NEEDED FOR PAIN 90 capsule 3     CVS VITAMIN B12 1000 MCG OR TABS 1 tablet daily       fenofibrate 54 MG tablet TAKE 1 TABLET BY MOUTH ONCE DAILY 90 tablet 2     finasteride (PROSCAR) 5 MG tablet Take 1 tablet (5 mg) by mouth daily 90 tablet 3     latanoprost (XALATAN) 0.005 % ophthalmic solution Place 1 drop into both eyes daily At night 4.5 mL 3     lisinopril-hydrochlorothiazide (PRINZIDE/ZESTORETIC) 20-12.5 MG per tablet TAKE TWO TABLETS BY MOUTH ONCE DAILY 180 tablet 3     Multiple Vitamins-Minerals (ICAPS AREDS 2) CAPS Take 1 capsule by mouth 2 times daily       Multiple Vitamins-Minerals (ZINC PO)        multivitamin, therapeutic with minerals (THERA-VIT-M) TABS Take 1 tablet by mouth daily       naproxen sodium (ANAPROX) 220 MG tablet Take 1 tablet (220 mg) by mouth 2 times daily (with meals) 180 tablet 3     omeprazole (PRILOSEC) 40 MG capsule TAKE ONE CAPSULE BY MOUTH ONCE DAILY 90 capsule 3     rosuvastatin (CRESTOR) 40 MG tablet  "TAKE 1 TABLET BY MOUTH ONCE DAILY DUE  FOR  FULL  FASTING  PHYSICAL  FOR  FURTHER  REFILLS 90 tablet 3     tamsulosin (FLOMAX) 0.4 MG capsule Take 2 capsules (0.8 mg) by mouth daily 180 capsule 3     Allergies   Allergen Reactions     Penicillins      Pen-     Seasonal Allergies      BP Readings from Last 3 Encounters:   02/26/19 128/62   02/20/19 138/66   01/04/19 117/67    Wt Readings from Last 3 Encounters:   02/26/19 96.2 kg (212 lb)   02/20/19 91.6 kg (202 lb)   01/04/19 94.6 kg (208 lb 9.6 oz)                  Labs reviewed in EPIC    Reviewed and updated as needed this visit by clinical staff       Reviewed and updated as needed this visit by Provider         ROS:  Constitutional, HEENT, cardiovascular, pulmonary, GI, , musculoskeletal, neuro, skin, endocrine and psych systems are negative, except as otherwise noted.    This document serves as a record of the services and decisions personally performed and made by Jocelyne Boyer MD. It was created on her behalf by Sri Jenkins, a trained medical scribe. The creation of this document is based the provider's statements to the medical scribe.    Sri Jenkins February 26, 2019 4:03 PM  OBJECTIVE:     /62 (BP Location: Right arm, Patient Position: Chair, Cuff Size: Adult Large)   Pulse 72   Temp 98.7  F (37.1  C) (Oral)   Resp 16   Ht 1.689 m (5' 6.5\")   Wt 96.2 kg (212 lb)   SpO2 98%   BMI 33.71 kg/m    Body mass index is 33.71 kg/m .  GENERAL:  alert and no distress  HENT: ear canals and TM's normal, nose and mouth without ulcers or lesions  RESP: lungs clear to auscultation - no rales, rhonchi or wheezes  CV: regular rate and rhythm, normal S1 S2, no S3 or S4, no murmur, click or rub, no peripheral edema  MS: no gross musculoskeletal defects noted, no edema  PSYCH: mentation appears normal, affect normal/bright    Diagnostic Test Results:  No results found for this or any previous visit (from the past 24 hour(s)).    ASSESSMENT/PLAN: "   (J01.10) Acute non-recurrent frontal sinusitis  (primary encounter diagnosis)  --lungs clear; suspect cough is due to PND   -- will treat for sinus infection; cefdinir   -- okay to stop inhaler if not helping   -- reassured patient I do not believe he has a pulmonary embolism   -- has appointment on 3/14 and will f/u then   -- reviewed red flag sx for emergent evaluation   Plan: cefdinir (OMNICEF) 300 MG capsule              Follow up on 3/14 as scheduled or as needed.       The information in this document, created by the medical scribe for me, accurately reflects the services I personally performed and the decisions made by me. I have reviewed and approved this document for accuracy prior to leaving the patient care area.  Jocelyne Boyer MD  [facility]

## 2019-03-12 NOTE — PROGRESS NOTES
Discharge Note    Progress reporting period is from initial evaluation date (please see noted date below) to Jan 18, 2019.  Linked Episodes   Type: Episode: Status: Noted: Resolved: Last update: Updated by:   PHYSICAL THERAPY L hip 1/4/2019 Active 1/4/2019 1/18/2019 10:36 AM Kwadwo Guaman PT      Comments:       Rk failed to follow up and current status is unknown.  Please see information below for last relevant information on current status.  Patient seen for 3 visits.    SUBJECTIVE  Subjective changes noted by patient:  Pt is feeling improvement, the pain is gone, but comes back only rarely.  Pt does have some numbness in his toes on occasion as well but he describes that as normal.   .  Current pain level is 2/10.     Previous pain level was  4/10.   Changes in function:  Yes (See Goal flowsheet attached for changes in current functional level)  Adverse reaction to treatment or activity: None    OBJECTIVE  Changes noted in objective findings: TROM: flex wnl, ext min loss, SG R and L wnl.     ASSESSMENT/PLAN  Diagnosis: L hip and leg pain   STG/LTGs have been met or progress has been made towards goals:  Yes, please see goal flowsheet for most current information  Assessment of Progress: current status is unknown.    Last current status: Pt is progressing as expected   Self Management Plans:  HEP  I have re-evaluated this patient and find that the nature, scope, duration and intensity of the therapy is appropriate for the medical condition of the patient.  Rk continues to require the following intervention to meet STG and LTG's:  HEP.    Recommendations:  Discharge with current home program.  Patient to follow up with MD as needed.    Please refer to the daily flowsheet for treatment today, total treatment time and time spent performing 1:1 timed codes.

## 2019-03-14 ENCOUNTER — OFFICE VISIT (OUTPATIENT)
Dept: PEDIATRICS | Facility: CLINIC | Age: 84
End: 2019-03-14
Payer: MEDICARE

## 2019-03-14 VITALS
OXYGEN SATURATION: 94 % | HEIGHT: 67 IN | DIASTOLIC BLOOD PRESSURE: 80 MMHG | BODY MASS INDEX: 33.07 KG/M2 | WEIGHT: 210.7 LBS | HEART RATE: 65 BPM | SYSTOLIC BLOOD PRESSURE: 130 MMHG | TEMPERATURE: 98 F

## 2019-03-14 DIAGNOSIS — E66.09 CLASS 1 OBESITY DUE TO EXCESS CALORIES WITH SERIOUS COMORBIDITY IN ADULT, UNSPECIFIED BMI: ICD-10-CM

## 2019-03-14 DIAGNOSIS — N40.1 BENIGN PROSTATIC HYPERPLASIA WITH LOWER URINARY TRACT SYMPTOMS, SYMPTOM DETAILS UNSPECIFIED: ICD-10-CM

## 2019-03-14 DIAGNOSIS — Z00.00 ROUTINE GENERAL MEDICAL EXAMINATION AT A HEALTH CARE FACILITY: Primary | ICD-10-CM

## 2019-03-14 DIAGNOSIS — E78.5 HYPERLIPIDEMIA WITH TARGET LDL LESS THAN 130: ICD-10-CM

## 2019-03-14 DIAGNOSIS — N18.30 CKD (CHRONIC KIDNEY DISEASE) STAGE 3, GFR 30-59 ML/MIN (H): ICD-10-CM

## 2019-03-14 DIAGNOSIS — K21.9 GASTROESOPHAGEAL REFLUX DISEASE WITHOUT ESOPHAGITIS: ICD-10-CM

## 2019-03-14 DIAGNOSIS — R73.03 PREDIABETES: ICD-10-CM

## 2019-03-14 DIAGNOSIS — E66.811 CLASS 1 OBESITY DUE TO EXCESS CALORIES WITH SERIOUS COMORBIDITY IN ADULT, UNSPECIFIED BMI: ICD-10-CM

## 2019-03-14 DIAGNOSIS — M19.90 OSTEOARTHRITIS, UNSPECIFIED OSTEOARTHRITIS TYPE, UNSPECIFIED SITE: ICD-10-CM

## 2019-03-14 DIAGNOSIS — N18.30 BENIGN HYPERTENSION WITH CKD (CHRONIC KIDNEY DISEASE) STAGE III (H): ICD-10-CM

## 2019-03-14 DIAGNOSIS — I25.10 CORONARY ARTERY DISEASE INVOLVING NATIVE HEART WITHOUT ANGINA PECTORIS, UNSPECIFIED VESSEL OR LESION TYPE: ICD-10-CM

## 2019-03-14 DIAGNOSIS — I12.9 BENIGN HYPERTENSION WITH CKD (CHRONIC KIDNEY DISEASE) STAGE III (H): ICD-10-CM

## 2019-03-14 PROBLEM — L57.0 AK (ACTINIC KERATOSIS): Status: ACTIVE | Noted: 2017-09-02

## 2019-03-14 PROBLEM — L82.1 SEBORRHEIC KERATOSIS: Status: ACTIVE | Noted: 2017-09-02

## 2019-03-14 LAB
ALBUMIN SERPL-MCNC: 4 G/DL (ref 3.4–5)
ALP SERPL-CCNC: 62 U/L (ref 40–150)
ALT SERPL W P-5'-P-CCNC: 40 U/L (ref 0–70)
ANION GAP SERPL CALCULATED.3IONS-SCNC: 8 MMOL/L (ref 3–14)
AST SERPL W P-5'-P-CCNC: 29 U/L (ref 0–45)
BILIRUB SERPL-MCNC: 0.4 MG/DL (ref 0.2–1.3)
BUN SERPL-MCNC: 29 MG/DL (ref 7–30)
CALCIUM SERPL-MCNC: 9.5 MG/DL (ref 8.5–10.1)
CHLORIDE SERPL-SCNC: 107 MMOL/L (ref 94–109)
CHOLEST SERPL-MCNC: 105 MG/DL
CO2 SERPL-SCNC: 25 MMOL/L (ref 20–32)
CREAT SERPL-MCNC: 1.35 MG/DL (ref 0.66–1.25)
CREAT UR-MCNC: 185 MG/DL
GFR SERPL CREATININE-BSD FRML MDRD: 49 ML/MIN/{1.73_M2}
GLUCOSE SERPL-MCNC: 115 MG/DL (ref 70–99)
HBA1C MFR BLD: 6.1 % (ref 0–5.6)
HDLC SERPL-MCNC: 39 MG/DL
HGB BLD-MCNC: 13.3 G/DL (ref 13.3–17.7)
LDLC SERPL CALC-MCNC: 33 MG/DL
MICROALBUMIN UR-MCNC: 44 MG/L
MICROALBUMIN/CREAT UR: 23.51 MG/G CR (ref 0–17)
NONHDLC SERPL-MCNC: 66 MG/DL
POTASSIUM SERPL-SCNC: 4.5 MMOL/L (ref 3.4–5.3)
PROT SERPL-MCNC: 7.3 G/DL (ref 6.8–8.8)
SODIUM SERPL-SCNC: 140 MMOL/L (ref 133–144)
TRIGL SERPL-MCNC: 166 MG/DL

## 2019-03-14 PROCEDURE — 83036 HEMOGLOBIN GLYCOSYLATED A1C: CPT | Performed by: INTERNAL MEDICINE

## 2019-03-14 PROCEDURE — 85018 HEMOGLOBIN: CPT | Performed by: INTERNAL MEDICINE

## 2019-03-14 PROCEDURE — 99214 OFFICE O/P EST MOD 30 MIN: CPT | Mod: 25 | Performed by: INTERNAL MEDICINE

## 2019-03-14 PROCEDURE — 80061 LIPID PANEL: CPT | Performed by: INTERNAL MEDICINE

## 2019-03-14 PROCEDURE — G0439 PPPS, SUBSEQ VISIT: HCPCS | Performed by: INTERNAL MEDICINE

## 2019-03-14 PROCEDURE — 82043 UR ALBUMIN QUANTITATIVE: CPT | Performed by: INTERNAL MEDICINE

## 2019-03-14 PROCEDURE — 36415 COLL VENOUS BLD VENIPUNCTURE: CPT | Performed by: INTERNAL MEDICINE

## 2019-03-14 PROCEDURE — 80053 COMPREHEN METABOLIC PANEL: CPT | Performed by: INTERNAL MEDICINE

## 2019-03-14 RX ORDER — CELECOXIB 200 MG/1
CAPSULE ORAL
Qty: 90 CAPSULE | Refills: 3 | Status: SHIPPED | OUTPATIENT
Start: 2019-03-14

## 2019-03-14 RX ORDER — ROSUVASTATIN CALCIUM 40 MG/1
TABLET, COATED ORAL
Qty: 90 TABLET | Refills: 3 | Status: SHIPPED | OUTPATIENT
Start: 2019-03-14

## 2019-03-14 RX ORDER — OMEPRAZOLE 40 MG/1
CAPSULE, DELAYED RELEASE ORAL
Qty: 90 CAPSULE | Refills: 3 | Status: SHIPPED | OUTPATIENT
Start: 2019-03-14

## 2019-03-14 RX ORDER — FENOFIBRATE 54 MG/1
54 TABLET ORAL DAILY
Qty: 90 TABLET | Refills: 3 | Status: SHIPPED | OUTPATIENT
Start: 2019-03-14

## 2019-03-14 ASSESSMENT — ACTIVITIES OF DAILY LIVING (ADL): CURRENT_FUNCTION: HOUSEWORK REQUIRES ASSISTANCE

## 2019-03-14 ASSESSMENT — MIFFLIN-ST. JEOR: SCORE: 1611.42

## 2019-03-14 NOTE — PROGRESS NOTES
"   SUBJECTIVE:   CC: Rk Aldana is an 83 year old woman who presents for preventive health visit.     HPI  {Add if <65 person on Medicare  - Required Questions (Optional):342863}  {Outside tests to abstract? :174358}    {additional problems to add (Optional):938383}    Today's PHQ-2 Score:   PHQ-2 ( 1999 [employer]) 11/29/2018   Q1: Little interest or pleasure in doing things 0   Q2: Feeling down, depressed or hopeless 0   PHQ-2 Score 0       Abuse: Current or Past(Physical, Sexual or Emotional)- {YES/NO/NA:747140}  Do you feel safe in your environment? {YES/NO/NA:153085}    Social History     Tobacco Use     Smoking status: Never Smoker     Smokeless tobacco: Never Used   Substance Use Topics     Alcohol use: Yes     Comment: rarely     No flowsheet data found.{add AUDIT responses (Optional) (A score of 7 for adult men is an indication of hazardous drinking; a score of 8 or more is an indication of an alcohol use disorder.  A score of 7 or more for adult women is an indication of hazardous drinking or an alchohol use disorder):270403}    Reviewed orders with patient.  Reviewed health maintenance and updated orders accordingly - {Yes/No:616424::\"Yes\"}  {Chronicprobdata (Optional):554986}    {Mammo Decision Support (Optional):065782}    Pertinent mammograms are reviewed under the imaging tab.  History of abnormal Pap smear: {PAP HX:088014}     Reviewed and updated as needed this visit by clinical staff         Reviewed and updated as needed this visit by Provider        {HISTORY OPTIONS (Optional):973108}    Review of Systems  {FEMALE ROS (Optional):555502}     OBJECTIVE:   There were no vitals taken for this visit.  Physical Exam  {Exam Choices (Optional):232933}    {Diagnostic Test Results (Optional):871028::\"Diagnostic Test Results:\",\"none \"}    ASSESSMENT/PLAN:   {Diag Picklist:191992}    COUNSELING:  {FEMALE COUNSELING MESSAGES:854379::\"Reviewed preventive health counseling, as reflected in patient " "instructions\"}    BP Readings from Last 1 Encounters:   02/26/19 128/62     Estimated body mass index is 33.71 kg/m  as calculated from the following:    Height as of 2/26/19: 1.689 m (5' 6.5\").    Weight as of 2/26/19: 96.2 kg (212 lb).    {BP Counseling- Complete if BP >= 120/80  (Optional):036372}  {Weight Management Plan (ACO) Complete if BMI is abnormal-  Ages 18-64  BMI >24.9.  Age 65+ with BMI <23 or >30 (Optional):041672}     reports that  has never smoked. he has never used smokeless tobacco.  {Tobacco Cessation -- Complete if patient is a smoker (Optional):307176}    Counseling Resources:  ATP IV Guidelines  Pooled Cohorts Equation Calculator  Breast Cancer Risk Calculator  FRAX Risk Assessment  ICSI Preventive Guidelines  Dietary Guidelines for Americans, 2010  USDA's MyPlate  ASA Prophylaxis  Lung CA Screening    Jocelyne Boyer MD  [facility]  "

## 2019-03-14 NOTE — PROGRESS NOTES
"SUBJECTIVE:   Rk Aldana is a 83 year old male who presents for Preventive Visit.    Patient reports 3-4 days into abx he felt a \"new pressure\" in his forehead but is feeling better now. He still has some drainage from sinuses. Daughter notes he has had the drainage for months.   No change in chest pain or shortness of breath. Has not made it back to the [facility].   Is working on balancing his diet. Normally eats a healthy breakfast. Forgets lunch a lot but is trying. Notes his \"fallback\" is microwave meals. Has decreased appetite for protein.   Is moving more slowly but thinks it is \"my subconscious\" and \"my mind is making me do this due to all the ice\".   Still has some paraesthesias in the ends of his toes. He thinks he might need new shoes.   Are you in the first 12 months of your Medicare coverage?  No    Annual Wellness Visit     In general, how would you rate your overall health?  Good    Frequency of exercise:  1 day/week    Duration of exercise:  Less than 15 minutes    Do you usually eat at least 4 servings of fruit and vegetables a day, include whole grains    & fiber and avoid regularly eating high fat or \"junk\" foods?  Yes    Taking medications regularly:  Yes    Medication side effects:  None    Ability to successfully perform activities of daily living:  Housework requires assistance (yes needs some help )    Home Safety:  No safety concerns identified    Hearing Impairment:  No hearing concerns    In the past 6 months, have you been bothered by leaking of urine? Yes   In general, how would you rate your overall mental or emotional health?  Good    PHQ-2 Total Score:    Additional concerns today:  No    Do you feel safe in your environment? Yes    Do you have a Health Care Directive? Yes: Patient states has Advance Directive and will bring in a copy to clinic.      Fall risk       Cognitive Screening   1) Repeat 3 items (Leader, Season, Table)    2) Clock draw: NORMAL  3) 3 item recall: Recalls 2 " objects   Results: NORMAL clock, 1-2 items recalled: COGNITIVE IMPAIRMENT LESS LIKELY    Mini-CogTM Copyright S Afia. Licensed by the author for use in [facility]; reprinted with permission (arden@.[facility]). All rights reserved.      Do you have sleep apnea, excessive snoring or daytime drowsiness?: no    Reviewed and updated as needed this visit by clinical staff  Tobacco  Allergies  Meds         Reviewed and updated as needed this visit by Provider        Social History     Tobacco Use     Smoking status: Never Smoker     Smokeless tobacco: Never Used   Substance Use Topics     Alcohol use: Yes     Comment: rarely       No flowsheet data found.No flowsheet data found.        Current providers sharing in care for this patient include:   Patient Care Team:  Jocelyne Boyer MD as PCP - General (Pediatrics)  Jocelyne Boyer MD as Assigned PCP    The following health maintenance items are reviewed in Epic and correct as of today:  Health Maintenance   Topic Date Due     ZOSTER IMMUNIZATION (2 of 3) 09/23/2011     DTAP/TDAP/TD IMMUNIZATION (2 - Td) 04/15/2015     HEMOGLOBIN Q1 YR  10/21/2018     MEDICARE ANNUAL WELLNESS VISIT  03/14/2019     FALL RISK ASSESSMENT  03/14/2019     CMP Q1 YR  03/21/2019     LIPID MONITORING Q1 YEAR  03/21/2019     MICROALBUMIN Q1 YEAR  03/21/2019     A1C Q1 YR  03/21/2019     PHQ-2 Q1 YR  11/29/2019     COLONOSCOPY Q5 YR  12/13/2021     ADVANCE DIRECTIVE PLANNING Q5 YRS  02/26/2024     INFLUENZA VACCINE  Completed     IPV IMMUNIZATION  Aged Out     MENINGITIS IMMUNIZATION  Aged Out     Labs reviewed in EPIC  BP Readings from Last 3 Encounters:   03/14/19 130/80   02/26/19 128/62   02/20/19 138/66    Wt Readings from Last 3 Encounters:   03/14/19 95.6 kg (210 lb 11.2 oz)   02/26/19 96.2 kg (212 lb)   02/20/19 91.6 kg (202 lb)                  Patient Active Problem List   Diagnosis     BILATERAL CAROTID BRUITS     B-complex deficiency     BPH (benign  prostatic hyperplasia)     Diverticulosis of colon with hemorrhage     Hyperlipidemia with target LDL less than 130     Advanced directives, counseling/discussion     CAD (coronary artery disease)     Hx of adenomatous colonic polyps     Prediabetes     Back pain     GERD (gastroesophageal reflux disease)     CKD (chronic kidney disease) stage 3, GFR 30-59 ml/min (H)     Benign hypertension with CKD (chronic kidney disease) stage III (H)     Obesity (BMI 30-39.9)     Left medial knee pain     Obesity     AK (actinic keratosis)     Seborrheic keratosis     Hip pain, left     Past Surgical History:   Procedure Laterality Date     SURGICAL HISTORY OF -   03/27/00    Colonoscopy       Social History     Tobacco Use     Smoking status: Never Smoker     Smokeless tobacco: Never Used   Substance Use Topics     Alcohol use: Yes     Comment: rarely     Family History   Problem Relation Age of Onset     Connective Tissue Disorder Mother         LUPUS     Cerebrovascular Disease Mother      Cancer - colorectal Father         70     Other Cancer Father          Current Outpatient Medications   Medication Sig Dispense Refill     albuterol (PROAIR HFA/PROVENTIL HFA/VENTOLIN HFA) 108 (90 Base) MCG/ACT inhaler Inhale 2 puffs into the lungs every 6 hours 1 Inhaler 0     Ascorbic Acid (VITAMIN C PO)        aspirin 81 MG tablet Take by mouth daily.  30 0     atenolol (TENORMIN) 100 MG tablet Take 1 tablet (100 mg) by mouth daily 90 tablet 3     celecoxib (CELEBREX) 200 MG capsule TAKE ONE CAPSULE BY MOUTH ONCE DAILY AS NEEDED FOR PAIN 90 capsule 3     CVS VITAMIN B12 1000 MCG OR TABS 1 tablet daily       fenofibrate 54 MG tablet TAKE 1 TABLET BY MOUTH ONCE DAILY 90 tablet 2     finasteride (PROSCAR) 5 MG tablet Take 1 tablet (5 mg) by mouth daily 90 tablet 3     latanoprost (XALATAN) 0.005 % ophthalmic solution Place 1 drop into both eyes daily At night 4.5 mL 3     lisinopril-hydrochlorothiazide (PRINZIDE/ZESTORETIC) 20-12.5 MG per  "tablet TAKE TWO TABLETS BY MOUTH ONCE DAILY 180 tablet 3     Multiple Vitamins-Minerals (ICAPS AREDS 2) CAPS Take 1 capsule by mouth 2 times daily       Multiple Vitamins-Minerals (ZINC PO)        multivitamin, therapeutic with minerals (THERA-VIT-M) TABS Take 1 tablet by mouth daily       naproxen sodium (ANAPROX) 220 MG tablet Take 1 tablet (220 mg) by mouth 2 times daily (with meals) 180 tablet 3     omeprazole (PRILOSEC) 40 MG capsule TAKE ONE CAPSULE BY MOUTH ONCE DAILY 90 capsule 3     rosuvastatin (CRESTOR) 40 MG tablet TAKE 1 TABLET BY MOUTH ONCE DAILY DUE  FOR  FULL  FASTING  PHYSICAL  FOR  FURTHER  REFILLS 90 tablet 3     tamsulosin (FLOMAX) 0.4 MG capsule Take 2 capsules (0.8 mg) by mouth daily 180 capsule 3     Allergies   Allergen Reactions     Penicillins      Pen-     Seasonal Allergies          Review of Systems  Constitutional, HEENT, cardiovascular, pulmonary, GI, , musculoskeletal, neuro, skin, endocrine and psych systems are negative, except as otherwise noted.    This document serves as a record of the services and decisions personally performed and made by Jocelyne Boyer MD. It was created on her behalf by Sri Jenkins, a trained medical scribe. The creation of this document is based the provider's statements to the medical scribe.    Sri Jenkins March 14, 2019 9:41 AM  OBJECTIVE:   /80 (BP Location: Right arm, Patient Position: Chair, Cuff Size: Adult Regular)   Pulse 65   Temp 98  F (36.7  C) (Oral)   Ht 1.689 m (5' 6.5\")   Wt 95.6 kg (210 lb 11.2 oz)   SpO2 94%   BMI 33.50 kg/m   Estimated body mass index is 33.5 kg/m  as calculated from the following:    Height as of this encounter: 1.689 m (5' 6.5\").    Weight as of this encounter: 95.6 kg (210 lb 11.2 oz).  Physical Exam  GENERAL: , alert and no distress  EYES: Eyes grossly normal to inspection, PERRL and conjunctivae and sclerae normal  HENT: ear canals and TM's normal, nose and mouth without ulcers or " lesions  NECK: no adenopathy, no asymmetry, masses, or scars and thyroid normal to palpation  RESP: lungs clear to auscultation - no rales, rhonchi or wheezes  CV: regular rate and rhythm, normal S1 S2, no S3 or S4, no murmur, click or rub, no peripheral edema and peripheral pulses strong  ABDOMEN: soft, nontender, and bowel sounds normal  MS: no gross musculoskeletal defects noted, no edema  SKIN: no suspicious lesions or rashes  NEURO: Normal strength and tone, mentation intact and speech normal  PSYCH: mentation appears normal, affect normal/bright    Diagnostic Test Results:  No results found for this or any previous visit (from the past 24 hour(s)).    ASSESSMENT / PLAN:   (Z00.00) Routine general medical examination at a health care facility  (primary encounter diagnosis)  -- imm utd; shingrix recommended   --goal to get back to the [facility] for health, social and psychological reasons   -- encouraged balanced diet, reduce prepackaged foods   -- goal to ask daughter to help him having food ready when he comes home from seeing wife   -- discussed behavioral techniques to help increase exercise       (I12.9,  N18.3) Benign hypertension with CKD (chronic kidney disease) stage III (H)  --BP at goal; kidney functions stable  -- repeat labs today   Plan: COMPREHENSIVE METABOLIC PANEL      (I25.10) Coronary artery disease involving native heart without angina pectoris, unspecified vessel or lesion type  -- stable no changes in sx   -- goal to get back to the [facility] and exercise     (E78.5) Hyperlipidemia with target LDL less than 130  -- recheck fasting labs today   -- no changes to medications   Plan: COMPREHENSIVE METABOLIC PANEL, Lipid panel         reflex to direct LDL Fasting, fenofibrate         (LOFIBRA) 54 MG tablet, rosuvastatin (CRESTOR)         40 MG tablet    (N40.1) Benign prostatic hyperplasia with lower urinary tract symptoms, symptom details unspecified  -- stable; no changes in symptoms   -- continue  "without changes     (N18.3) CKD (chronic kidney disease) stage 3, GFR 30-59 ml/min (H)  -- stable; recheck today   Plan: COMPREHENSIVE METABOLIC PANEL, Hemoglobin,         Albumin Random Urine Quantitative with Creat         Ratio      (E66.09) Class 1 obesity due to excess calories with serious comorbidity in adult, unspecified BMI  -- encouraged regular exercise and balanced diet       (R73.03) Prediabetes  -- recheck today   -- encouraged regular exercise and balanced diet   Plan: COMPREHENSIVE METABOLIC PANEL, HEMOGLOBIN A1C      (M19.90) Osteoarthritis, unspecified osteoarthritis type, unspecified site  -- controlled currently; continue celebrex without changes   Plan: celecoxib (CELEBREX) 200 MG capsule          (K21.9) Gastroesophageal reflux disease without esophagitis  -- controlled with Prilosec; no changes   Plan: omeprazole (PRILOSEC) 40 MG DR capsule    Follow up on September 13th for follow up or as needed.       End of Life Planning:  Patient currently has an advanced directive: Yes.  Practitioner is supportive of decision.    COUNSELING:  Reviewed preventive health counseling, as reflected in patient instructions       Regular exercise       Healthy diet/nutrition       Dental care       Bladder control    BP Readings from Last 1 Encounters:   03/14/19 130/80     Estimated body mass index is 33.5 kg/m  as calculated from the following:    Height as of this encounter: 1.689 m (5' 6.5\").    Weight as of this encounter: 95.6 kg (210 lb 11.2 oz).      Weight management plan: Discussed healthy diet and exercise guidelines     reports that  has never smoked. he has never used smokeless tobacco.      Appropriate preventive services were discussed with this patient, including applicable screening as appropriate for cardiovascular disease, diabetes, osteopenia/osteoporosis, and glaucoma.  As appropriate for age/gender, discussed screening for colorectal cancer, prostate cancer, breast cancer, and cervical " cancer. Checklist reviewing preventive services available has been given to the patient.    Reviewed patients plan of care and provided an AVS. The Basic Care Plan (routine screening as documented in Health Maintenance) for kR meets the Care Plan requirement. This Care Plan has been established and reviewed with the Patient.    Counseling Resources:  ATP IV Guidelines  Pooled Cohorts Equation Calculator  Breast Cancer Risk Calculator  FRAX Risk Assessment  ICSI Preventive Guidelines  Dietary Guidelines for Americans, 2010  [employer]'s MyPlate  ASA Prophylaxis  Lung CA Screening      The information in this document, created by the medical scribe for me, accurately reflects the services I personally performed and the decisions made by me. I have reviewed and approved this document for accuracy prior to leaving the patient care area.  Jocelyne Boyer MD  [facility]

## 2019-03-14 NOTE — PATIENT INSTRUCTIONS
"Try taking a daily antihistamine (get some new ones) once per day or Flonase in nose once per day.     Goal is to get back to the [facility]. Place bag by the door and have your daughter hold you accountable.     Talk with daughter about ways to have food ready when coming home from seeing Abi.     Stop at the pharmacy and they will run your insurance to see if the shingles vaccination is covered; they can give you the shot at the pharmacy. [facility] and [facility] [address] are \"rumored\" to have them right now.           Preventive Health Recommendations:     See your health care provider every year to    Review health changes.     Discuss preventive care.      Review your medicines if your doctor has prescribed any.    Talk with your health care provider about whether you should have a test to screen for prostate cancer (PSA).    Every 3 years, have a diabetes test (fasting glucose). If you are at risk for diabetes, you should have this test more often.    Every 5 years, have a cholesterol test. Have this test more often if you are at risk for high cholesterol or heart disease.     Every 10 years, have a colonoscopy. Or, have a yearly FIT test (stool test). These exams will check for colon cancer.    Talk to with your health care provider about screening for Abdominal Aortic Aneurysm if you have a family history of AAA or have a history of smoking.  Shots:     Get a flu shot each year.     Get a tetanus shot every 10 years.     Talk to your doctor about your pneumonia vaccines. There are now two you should receive - Pneumovax (PPSV 23) and Prevnar (PCV 13).    Talk to your pharmacist about a shingles vaccine.     Talk to your doctor about the hepatitis B vaccine.  Nutrition:     Eat at least 5 servings of fruits and vegetables each day.     Eat whole-grain bread, whole-wheat pasta and brown rice instead of white grains and rice.     Get adequate Calcium and Vitamin D.   Lifestyle    Exercise for at least 150 minutes a week " (30 minutes a day, 5 days a week). This will help you control your weight and prevent disease.     Limit alcohol to one drink per day.     No smoking.     Wear sunscreen to prevent skin cancer.     See your dentist every six months for an exam and cleaning.     See your eye doctor every 1 to 2 years to screen for conditions such as glaucoma, macular degeneration and cataracts.    Personalized Prevention Plan  You are due for the preventive services outlined below.  Your care team is available to assist you in scheduling these services.  If you have already completed any of these items, please share that information with your care team to update in your medical record.    Health Maintenance Due   Topic Date Due     Zoster (Shingles) Vaccine (2 of 3) 09/23/2011     Diptheria Tetanus Pertussis (DTAP/TDAP/TD) Vaccine (2 - Td) 04/15/2015     Hemoglobin Lab - yearly  10/21/2018     Annual Wellness Visit  03/14/2019     FALL RISK ASSESSMENT  03/14/2019     Comprehensive Metabolic Lab - yearly  03/21/2019     Cholesterol Lab - yearly  03/21/2019     Microalbumin Lab - yearly  03/21/2019     A1C (Diabetes)  Lab - yearly  03/21/2019

## 2019-04-09 PROBLEM — M25.552 HIP PAIN, LEFT: Status: RESOLVED | Noted: 2019-01-04 | Resolved: 2019-04-09

## 2019-07-22 ENCOUNTER — TELEPHONE (OUTPATIENT)
Dept: PEDIATRICS | Facility: CLINIC | Age: 84
End: 2019-07-22

## 2019-07-22 NOTE — TELEPHONE ENCOUNTER
Reason for Call: Call back    Detailed comments: On going Rt Knee pain and swelling x 3 weeks.     Phone Number Patient can be reached at: Cell number on file:    Telephone Information:   Mobile [phone]       Best Time: any     Can we leave a detailed message on this number? YES    Call taken on 7/22/2019 at 8:53 AM by Lexus Silverio

## 2019-07-22 NOTE — TELEPHONE ENCOUNTER
Called patient. Advised on Ortho if pain continues or worsens. Patient agrees with plan.   He has [facility]'s number if needed.     Patient noticed right knee pain after gardening 3 weeks ago. No specific injury.     Has been icing, wearing a knee brace and take otc pain meds prn.     Overall pain is improving.     Reviewed home care measures to continue for another week, Ortho if not continuing to improve.   Slow exercises either on recumbent bike or pool if he'd like.

## 2019-09-13 ENCOUNTER — OFFICE VISIT (OUTPATIENT)
Dept: PEDIATRICS | Facility: CLINIC | Age: 84
End: 2019-09-13
Payer: MEDICARE

## 2019-09-13 VITALS
TEMPERATURE: 97.8 F | HEART RATE: 67 BPM | BODY MASS INDEX: 32.79 KG/M2 | OXYGEN SATURATION: 95 % | WEIGHT: 208.9 LBS | SYSTOLIC BLOOD PRESSURE: 112 MMHG | DIASTOLIC BLOOD PRESSURE: 60 MMHG | HEIGHT: 67 IN

## 2019-09-13 DIAGNOSIS — N18.30 BENIGN HYPERTENSION WITH CKD (CHRONIC KIDNEY DISEASE) STAGE III (H): Primary | ICD-10-CM

## 2019-09-13 DIAGNOSIS — N18.30 CKD (CHRONIC KIDNEY DISEASE) STAGE 3, GFR 30-59 ML/MIN (H): ICD-10-CM

## 2019-09-13 DIAGNOSIS — N40.0 BENIGN NON-NODULAR PROSTATIC HYPERPLASIA WITHOUT LOWER URINARY TRACT SYMPTOMS: ICD-10-CM

## 2019-09-13 DIAGNOSIS — E66.09 CLASS 1 OBESITY DUE TO EXCESS CALORIES WITH SERIOUS COMORBIDITY IN ADULT, UNSPECIFIED BMI: ICD-10-CM

## 2019-09-13 DIAGNOSIS — E66.811 CLASS 1 OBESITY DUE TO EXCESS CALORIES WITH SERIOUS COMORBIDITY IN ADULT, UNSPECIFIED BMI: ICD-10-CM

## 2019-09-13 DIAGNOSIS — Z12.5 ENCOUNTER FOR SCREENING FOR MALIGNANT NEOPLASM OF PROSTATE: ICD-10-CM

## 2019-09-13 DIAGNOSIS — M25.561 ACUTE PAIN OF RIGHT KNEE: ICD-10-CM

## 2019-09-13 DIAGNOSIS — I12.9 BENIGN HYPERTENSION WITH CKD (CHRONIC KIDNEY DISEASE) STAGE III (H): Primary | ICD-10-CM

## 2019-09-13 DIAGNOSIS — I25.10 CORONARY ARTERY DISEASE INVOLVING NATIVE HEART WITHOUT ANGINA PECTORIS, UNSPECIFIED VESSEL OR LESION TYPE: ICD-10-CM

## 2019-09-13 LAB — PSA SERPL-ACNC: 1.67 UG/L (ref 0–4)

## 2019-09-13 PROCEDURE — 99214 OFFICE O/P EST MOD 30 MIN: CPT | Performed by: INTERNAL MEDICINE

## 2019-09-13 PROCEDURE — 36415 COLL VENOUS BLD VENIPUNCTURE: CPT | Performed by: INTERNAL MEDICINE

## 2019-09-13 PROCEDURE — 82043 UR ALBUMIN QUANTITATIVE: CPT | Performed by: INTERNAL MEDICINE

## 2019-09-13 PROCEDURE — G0103 PSA SCREENING: HCPCS | Performed by: INTERNAL MEDICINE

## 2019-09-13 RX ORDER — LISINOPRIL AND HYDROCHLOROTHIAZIDE 12.5; 2 MG/1; MG/1
TABLET ORAL
Qty: 180 TABLET | Refills: 3 | Status: SHIPPED | OUTPATIENT
Start: 2019-09-13

## 2019-09-13 RX ORDER — FINASTERIDE 5 MG/1
1 TABLET, FILM COATED ORAL DAILY
Qty: 90 TABLET | Refills: 3 | Status: SHIPPED | OUTPATIENT
Start: 2019-09-13

## 2019-09-13 RX ORDER — TAMSULOSIN HYDROCHLORIDE 0.4 MG/1
0.8 CAPSULE ORAL DAILY
Qty: 180 CAPSULE | Refills: 3 | Status: SHIPPED | OUTPATIENT
Start: 2019-09-13

## 2019-09-13 ASSESSMENT — MIFFLIN-ST. JEOR: SCORE: 1598.25

## 2019-09-13 NOTE — PATIENT INSTRUCTIONS
Stop vitamin C     Try stopping aleve or celebrex to see if you can take just one of them.      Stop in pharmacy for shingles vaccine.

## 2019-09-13 NOTE — PROGRESS NOTES
"Subjective     Rk Aldana is a 84 year old male who presents to clinic today for the following health issues:    HPI   Pt is here today to f/up on meds and refills.    He notes some shortness of breath when he is going up steps, does not seem to be worsening.  Denies chest pain.      Not checking bp at home,  No problems taking meds    Taking new eye drop for glaucoma, will call for name.      Still with urinary dribbling.  Getting up 2-8 x nightly to urinate.  8 is rare, typically gets up 2-3 x.  Has a hard time going back to sleep.      Is taking both celebrex 200mg and aleve.  Currently taking aleve 2 x daily.  Having significant right knee pain.  Has been using a cane and a leg wrap.  Started about 2 months ago without an injury.  Feels that now its starting to improve, limping is better this week.  He feels that knee is weak and unstable.  Is starting at the [facility] again on Monday.      He notes he has had a \"pinched nerve\" in his back in the past, but this pain resolved when knee pain started.        Is noticing small amount of dizziness intermittently, not with standing.          Patient Active Problem List   Diagnosis     BILATERAL CAROTID BRUITS     B-complex deficiency     BPH (benign prostatic hyperplasia)     Diverticulosis of colon with hemorrhage     Hyperlipidemia with target LDL less than 130     Advanced directives, counseling/discussion     CAD (coronary artery disease)     Hx of adenomatous colonic polyps     Prediabetes     Back pain     GERD (gastroesophageal reflux disease)     CKD (chronic kidney disease) stage 3, GFR 30-59 ml/min (H)     Benign hypertension with CKD (chronic kidney disease) stage III (H)     Obesity (BMI 30-39.9)     Left medial knee pain     Obesity     AK (actinic keratosis)     Seborrheic keratosis     Past Surgical History:   Procedure Laterality Date     SURGICAL HISTORY OF -   03/27/00    Colonoscopy       Social History     Tobacco Use     Smoking status: Never " Smoker     Smokeless tobacco: Never Used   Substance Use Topics     Alcohol use: Yes     Comment: rarely     Family History   Problem Relation Age of Onset     Connective Tissue Disorder Mother         LUPUS     Cerebrovascular Disease Mother      Cancer - colorectal Father         70     Other Cancer Father          Current Outpatient Medications   Medication Sig Dispense Refill     aspirin 81 MG tablet Take by mouth daily.  30 0     atenolol (TENORMIN) 100 MG tablet Take 1 tablet (100 mg) by mouth daily 90 tablet 3     celecoxib (CELEBREX) 200 MG capsule TAKE ONE CAPSULE BY MOUTH ONCE DAILY AS NEEDED FOR PAIN 90 capsule 3     CVS VITAMIN B12 1000 MCG OR TABS 1 tablet daily       fenofibrate (LOFIBRA) 54 MG tablet Take 1 tablet (54 mg) by mouth daily 90 tablet 3     finasteride (PROSCAR) 5 MG tablet Take 1 tablet (5 mg) by mouth daily 90 tablet 3     latanoprost (XALATAN) 0.005 % ophthalmic solution Place 1 drop into both eyes daily At night 4.5 mL 3     lisinopril-hydrochlorothiazide (PRINZIDE/ZESTORETIC) 20-12.5 MG tablet TAKE TWO TABLETS BY MOUTH ONCE DAILY 180 tablet 3     Multiple Vitamins-Minerals (ICAPS AREDS 2) CAPS Take 1 capsule by mouth 2 times daily       multivitamin, therapeutic with minerals (THERA-VIT-M) TABS Take 1 tablet by mouth daily       naproxen sodium (ANAPROX) 220 MG tablet Take 1 tablet (220 mg) by mouth 2 times daily (with meals) 180 tablet 3     omeprazole (PRILOSEC) 40 MG DR capsule TAKE ONE CAPSULE BY MOUTH ONCE DAILY 90 capsule 3     rosuvastatin (CRESTOR) 40 MG tablet TAKE 1 TABLET BY MOUTH ONCE DAILY 90 tablet 3     tamsulosin (FLOMAX) 0.4 MG capsule Take 2 capsules (0.8 mg) by mouth daily 180 capsule 3     Allergies   Allergen Reactions     Penicillins      Pen-     Seasonal Allergies          Reviewed and updated as needed this visit by Provider         Review of Systems   ROS COMP: Constitutional, HEENT, cardiovascular, pulmonary, gi and gu systems are negative, except as  "otherwise noted.      Objective    /60 (BP Location: Right arm, Patient Position: Chair, Cuff Size: Adult Regular)   Pulse 67   Temp 97.8  F (36.6  C) (Oral)   Ht 1.689 m (5' 6.5\")   Wt 94.8 kg (208 lb 14.4 oz)   SpO2 95%   BMI 33.21 kg/m    Body mass index is 33.21 kg/m .  Physical Exam   GENERAL: alert and no distress  EYES: Eyes grossly normal to inspection, PERRL and conjunctivae and sclerae normal  HENT: ear canals and TM's normal, nose and mouth without ulcers or lesions  NECK: no adenopathy, no asymmetry, masses, or scars and thyroid normal to palpation  RESP: lungs clear to auscultation - no rales, rhonchi or wheezes  CV: regular rate and rhythm, normal S1 S2, no S3 or S4, no murmur, click or rub, no peripheral edema and peripheral pulses strong  ABDOMEN: soft, nontender and bowel sounds normal  MS: no gross musculoskeletal defects noted, no edema    Diagnostic Test Results:  Labs reviewed in Epic        Assessment & Plan     (I12.9,  N18.3) Benign hypertension with CKD (chronic kidney disease) stage III (H)  (primary encounter diagnosis)  -bp well controlled  -will check microalbumin today as was elevated on last check  -CKD otherwise stable, monitoring BMP yearly   Plan: lisinopril-hydrochlorothiazide         (PRINZIDE/ZESTORETIC) 20-12.5 MG tablet           (M25.561) Acute pain of right knee  -based on description I suspect he has meniscal tear   -pt wishes to continue to monitor, if not improving or worsening would refer to sports med       (I25.10) Coronary artery disease involving native heart without angina pectoris, unspecified vessel or lesion type  -asymptomatic  -recommend working on diet, exercise and weight loss   -bp, lipids at goal on statin, on asa, on b blocker     (N40.0) Benign non-nodular prostatic hyperplasia without lower urinary tract symptoms  -symptoms adquately controlled per the patient   -check PSA today   Plan: finasteride (PROSCAR) 5 MG tablet, tamsulosin         " "(FLOMAX) 0.4 MG capsule, PSA, screen            (E66.09) Class 1 obesity due to excess calories with serious comorbidity in adult, unspecified BMI  -discussed diet, exercise and weight loss     (N18.3) CKD (chronic kidney disease) stage 3, GFR 30-59 ml/min (H)  -recheck microalbumin as elevated on last check  -is on max dose of lisinopril, if persistent microalbuminuria there is no significant medication treatment to offer   Plan: Albumin Random Urine Quantitative with Creat         Ratio         (Z12.5) Encounter for screening for malignant neoplasm of prostate   Plan: PSA, screen         BMI:   Estimated body mass index is 33.21 kg/m  as calculated from the following:    Height as of this encounter: 1.689 m (5' 6.5\").    Weight as of this encounter: 94.8 kg (208 lb 14.4 oz).   Weight management plan: Discussed healthy diet and exercise guidelines        FUTURE APPOINTMENTS:       - Follow-up for annual visit or as needed- scheduled with Dr. Thompson in March 2020.  Pt is aware of my leaving the practice.      No follow-ups on file.    Jocelyne Boyer MD  [facility]         "

## 2019-09-14 LAB
CREAT UR-MCNC: 198 MG/DL
MICROALBUMIN UR-MCNC: 28 MG/L
MICROALBUMIN/CREAT UR: 14.19 MG/G CR (ref 0–17)

## 2019-10-29 ENCOUNTER — OFFICE VISIT (OUTPATIENT)
Dept: PEDIATRICS | Facility: CLINIC | Age: 84
End: 2019-10-29
Payer: MEDICARE

## 2019-10-29 VITALS
TEMPERATURE: 98.2 F | WEIGHT: 212.6 LBS | OXYGEN SATURATION: 95 % | BODY MASS INDEX: 33.8 KG/M2 | HEART RATE: 66 BPM | RESPIRATION RATE: 16 BRPM | DIASTOLIC BLOOD PRESSURE: 54 MMHG | SYSTOLIC BLOOD PRESSURE: 112 MMHG

## 2019-10-29 DIAGNOSIS — J40 BRONCHITIS: Primary | ICD-10-CM

## 2019-10-29 PROCEDURE — 99213 OFFICE O/P EST LOW 20 MIN: CPT | Performed by: INTERNAL MEDICINE

## 2019-10-29 RX ORDER — DOXYCYCLINE HYCLATE 100 MG
100 TABLET ORAL 2 TIMES DAILY
Qty: 20 TABLET | Refills: 0 | Status: SHIPPED | OUTPATIENT
Start: 2019-10-29

## 2019-10-29 RX ORDER — CODEINE PHOSPHATE AND GUAIFENESIN 10; 100 MG/5ML; MG/5ML
1-2 SOLUTION ORAL EVERY 4 HOURS PRN
Qty: 120 ML | Refills: 0 | Status: SHIPPED | OUTPATIENT
Start: 2019-10-29

## 2019-10-29 RX ORDER — ALBUTEROL SULFATE 90 UG/1
2 AEROSOL, METERED RESPIRATORY (INHALATION) EVERY 4 HOURS PRN
Qty: 1 INHALER | Refills: 1 | Status: SHIPPED | OUTPATIENT
Start: 2019-10-29

## 2019-10-29 NOTE — PROGRESS NOTES
"Subjective     Rk Aldana is a 84 year old male who presents to clinic today for the following health issues:    HPI   Acute Illness   Acute illness concerns: Cough   Onset: x3 wks     Fever: no     Chills/Sweats: YES- chills     Headache (location?): no    Sinus Pressure:YES    Conjunctivitis:  no    Ear Pain: YES: left    Rhinorrhea: no    Congestion: YES- chest    Sore Throat: no     Cough: YES    Wheeze: no    Decreased Appetite: no    Nausea: no    Vomiting: no    Diarrhea:  no    Dysuria/Freq.: no    Fatigue/Achiness: YES-both     Sick/Strep Exposure: YES- wife      Therapies Tried and outcome: mucinex dm-with no relief robutussin with no relief delsym with no relief     Started getting sick 10/12 with runny nose and cough.  Cough has not resolved, cant breathe during coughing spells.  He notes that his abdomen hurts significant with cough, also gets headache with cough.  Coughing is non productive.  Mild shortness of breath.  Has tried an albuterol inhaler that he had at home, felt much better after using the inhaler.  No fevers, does feel cold.  Notes significant fatigue.      He is noting haziness and a \"white out\" in his vision in the am, it does resolve through the day.          Patient Active Problem List   Diagnosis     BILATERAL CAROTID BRUITS     B-complex deficiency     BPH (benign prostatic hyperplasia)     Diverticulosis of colon with hemorrhage     Hyperlipidemia with target LDL less than 130     Advanced directives, counseling/discussion     CAD (coronary artery disease)     Hx of adenomatous colonic polyps     Prediabetes     Back pain     GERD (gastroesophageal reflux disease)     CKD (chronic kidney disease) stage 3, GFR 30-59 ml/min (H)     Benign hypertension with CKD (chronic kidney disease) stage III (H)     Obesity (BMI 30-39.9)     Left medial knee pain     Obesity     AK (actinic keratosis)     Seborrheic keratosis     Past Surgical History:   Procedure Laterality Date     SURGICAL " HISTORY OF -   03/27/00    Colonoscopy       Social History     Tobacco Use     Smoking status: Never Smoker     Smokeless tobacco: Never Used   Substance Use Topics     Alcohol use: Yes     Comment: rarely     Family History   Problem Relation Age of Onset     Connective Tissue Disorder Mother         LUPUS     Cerebrovascular Disease Mother      Cancer - colorectal Father         70     Other Cancer Father          Current Outpatient Medications   Medication Sig Dispense Refill     aspirin 81 MG tablet Take by mouth daily.  30 0     atenolol (TENORMIN) 100 MG tablet Take 1 tablet (100 mg) by mouth daily 90 tablet 3     celecoxib (CELEBREX) 200 MG capsule TAKE ONE CAPSULE BY MOUTH ONCE DAILY AS NEEDED FOR PAIN 90 capsule 3     CVS VITAMIN B12 1000 MCG OR TABS 1 tablet daily       fenofibrate (LOFIBRA) 54 MG tablet Take 1 tablet (54 mg) by mouth daily 90 tablet 3     finasteride (PROSCAR) 5 MG tablet Take 1 tablet (5 mg) by mouth daily 90 tablet 3     latanoprost (XALATAN) 0.005 % ophthalmic solution Place 1 drop into both eyes daily At night 4.5 mL 3     lisinopril-hydrochlorothiazide (PRINZIDE/ZESTORETIC) 20-12.5 MG tablet TAKE TWO TABLETS BY MOUTH ONCE DAILY 180 tablet 3     Multiple Vitamins-Minerals (ICAPS AREDS 2) CAPS Take 1 capsule by mouth 2 times daily       multivitamin, therapeutic with minerals (THERA-VIT-M) TABS Take 1 tablet by mouth daily       naproxen sodium (ANAPROX) 220 MG tablet Take 1 tablet (220 mg) by mouth 2 times daily (with meals) 180 tablet 3     omeprazole (PRILOSEC) 40 MG DR capsule TAKE ONE CAPSULE BY MOUTH ONCE DAILY 90 capsule 3     rosuvastatin (CRESTOR) 40 MG tablet TAKE 1 TABLET BY MOUTH ONCE DAILY 90 tablet 3     tamsulosin (FLOMAX) 0.4 MG capsule Take 2 capsules (0.8 mg) by mouth daily 180 capsule 3     Allergies   Allergen Reactions     Penicillins      Pen-     Seasonal Allergies        Reviewed and updated as needed this visit by Provider         Review of Systems   ROS COMP:  "Constitutional, HEENT, cardiovascular, pulmonary, gi and gu systems are negative, except as otherwise noted.      Objective    /54 (BP Location: Right arm, Patient Position: Chair, Cuff Size: Adult Regular)   Pulse 66   Temp 98.2  F (36.8  C) (Oral)   Resp 16   Wt 96.4 kg (212 lb 9.6 oz)   SpO2 95%   BMI 33.80 kg/m    Body mass index is 33.8 kg/m .  Physical Exam   GENERAL:  alert and no distress.  Frequent cough noted.   EYES: Eyes grossly normal to inspection, PERRL and conjunctivae and sclerae normal  HENT: ear canals and TM's normal, nose and mouth without ulcers or lesions  NECK: no adenopathy, no asymmetry, masses, or scars and thyroid normal to palpation  RESP: lungs clear to auscultation - no rales, rhonchi or wheezes  CV: regular rate and rhythm, normal S1 S2, no S3 or S4, no murmur, click or rub, no peripheral edema and peripheral pulses strong    Diagnostic Test Results:  Labs reviewed in Epic        Assessment & Plan     (J40) Bronchitis  (primary encounter diagnosis)  -will treat due to duration and lack of improvement  -as cough improves with inhaler sent refill for albuterol  -script send for robitussin ac  -treat with doxycycline  -call if worsening (fever, increasing shortness of breath or hemoptysis) or if not better after antibiotics completed.    Plan: albuterol (PROAIR HFA/PROVENTIL HFA/VENTOLIN         HFA) 108 (90 Base) MCG/ACT inhaler,         guaiFENesin-codeine (ROBITUSSIN AC) 100-10         MG/5ML solution, doxycycline hyclate         (VIBRA-TABS) 100 MG tablet          BMI:   Estimated body mass index is 33.8 kg/m  as calculated from the following:    Height as of 9/13/19: 1.689 m (5' 6.5\").    Weight as of this encounter: 96.4 kg (212 lb 9.6 oz).         FUTURE APPOINTMENTS:       - Follow-up for annual visit or as needed.  Pt is unsure if he will stay here or follow me to [place], names given.      No follow-ups on file.    Jocelyne Boyer MD  [facility] " [facility]

## 2019-11-01 NOTE — TELEPHONE ENCOUNTER
"Requested Prescriptions   Pending Prescriptions Disp Refills     lisinopril-hydrochlorothiazide (PRINZIDE/ZESTORETIC) 20-12.5 MG per tablet [Pharmacy Med Name: LISINOPRIL/HCTZ 20-12.5MG TAB]  Last Written Prescription Date:  01/17/2018  Last Fill Quantity: 180 tablet,  # refills: 0   Last office visit: 3/14/2018 with prescribing provider:  Jocelyne Boyer MD    Future Office Visit:     180 tablet 0     Sig: TAKE TWO TABLETS BY MOUTH ONCE DAILY    Diuretics (Including Combos) Protocol Passed    5/2/2018  5:30 AM       Passed - Blood pressure under 140/90 in past 12 months    BP Readings from Last 3 Encounters:   03/14/18 130/58   10/24/17 126/76   10/21/17 126/76                Passed - Recent (12 mo) or future (30 days) visit within the authorizing provider's specialty    Patient had office visit in the last 12 months or has a visit in the next 30 days with authorizing provider or within the authorizing provider's specialty.  See \"Patient Info\" tab in inbasket, or \"Choose Columns\" in Meds & Orders section of the refill encounter.           Passed - Patient is age 18 or older       Passed - Normal serum creatinine on file in past 12 months    Recent Labs   Lab Test  03/21/18   0841   CR  1.24             Passed - Normal serum potassium on file in past 12 months    Recent Labs   Lab Test  03/21/18   0841   POTASSIUM  4.3                   Passed - Normal serum sodium on file in past 12 months    Recent Labs   Lab Test  03/21/18   0841   NA  142                " No

## 2019-11-03 ENCOUNTER — HEALTH MAINTENANCE LETTER (OUTPATIENT)
Age: 84
End: 2019-11-03

## 2020-11-16 ENCOUNTER — HEALTH MAINTENANCE LETTER (OUTPATIENT)
Age: 85
End: 2020-11-16

## 2021-09-12 ENCOUNTER — HEALTH MAINTENANCE LETTER (OUTPATIENT)
Age: 86
End: 2021-09-12

## 2022-01-02 ENCOUNTER — HEALTH MAINTENANCE LETTER (OUTPATIENT)
Age: 87
End: 2022-01-02

## 2022-02-18 DIAGNOSIS — E78.5 HYPERLIPIDEMIA WITH TARGET LDL LESS THAN 130: ICD-10-CM

## 2022-02-18 RX ORDER — ROSUVASTATIN CALCIUM 40 MG/1
TABLET, COATED ORAL
Qty: 90 TABLET | Refills: 3 | Status: CANCELLED | OUTPATIENT
Start: 2022-02-18

## 2022-02-18 NOTE — TELEPHONE ENCOUNTER
Spoke with pt. Pt's care was transferred to [facility] with Dr. Boyer.       Lindsey Eden on 2/18/2022 at 4:00 PM

## 2022-02-18 NOTE — TELEPHONE ENCOUNTER
Routing to the station.     Former Dr. Boyer Pt. Needs to establish care with a new provider. Once scheduled with new provider please route pended medication to them to review. Also ask if there has been a break in medication. Thanks.     Very overdue for visit.     Kim White RN   [facility]  -- Triage Nurse

## 2022-06-16 ENCOUNTER — TRANSFERRED RECORDS (OUTPATIENT)
Dept: HEALTH INFORMATION MANAGEMENT | Facility: CLINIC | Age: 87
End: 2022-06-16

## 2022-11-19 ENCOUNTER — HEALTH MAINTENANCE LETTER (OUTPATIENT)
Age: 87
End: 2022-11-19

## 2023-04-09 ENCOUNTER — HEALTH MAINTENANCE LETTER (OUTPATIENT)
Age: 88
End: 2023-04-09

## 2023-09-15 NOTE — NURSING NOTE
"Chief Complaint   Patient presents with     Cough       Initial /60 (BP Location: Right arm, Patient Position: Sitting, Cuff Size: Adult Regular)  Pulse 68  Temp 95.9  F (35.5  C) (Tympanic)  Ht 5' 6.75\" (1.695 m)  Wt 207 lb 9.6 oz (94.2 kg)  SpO2 94%  BMI 32.76 kg/m2 Estimated body mass index is 32.76 kg/(m^2) as calculated from the following:    Height as of this encounter: 5' 6.75\" (1.695 m).    Weight as of this encounter: 207 lb 9.6 oz (94.2 kg).  Medication Reconciliation: complete     Martha Kaminski      " Pt read the paperwork wrong, needs to be generic. PA was denied for generic.  This medication may be excluded from the patient's benefit. For more information, please reach out to [facility] directly at [phone].

## 2023-10-06 NOTE — PROGRESS NOTES
"  SUBJECTIVE:   Rk Aldana is a 83 year old male who presents to clinic today for the following health issues:    Rk presents to the clinic for the complaint of dysuria. Patient is unsure when sx started and is accompanied by frequency and urgency, irritation at end of urethra, cloudy urine. Reports he is urinating about 10x a day.  Reports a new pain on his right lower back, but is unsure if it is due to infection and not another cause. Gets up twice a night to go to the bathroom; is not worse than normal. States at the end of his penis when he wipes \"feels funny\". Endorses blood in urine this morning Patient reports he has been drinking more coffee. He does not believe he fully empties his bladder when urinating. Denies fevers, chills, sweats, loss of bladder control.  He denies any nocturia, states he is up 2 x nightly to urinate.  He denies difficulty starting and stopping stream of urine, or weak urine stream.            Genitourinary symptoms      Duration: unsure     Description:  dysuria, frequency and urgency, irritation at end of urethra, cloudy urine     Intensity:  mild    Accompanying signs and symptoms (fever/discharge/nausea/vomiting/back or abdominal pain):  None    History (frequent UTI's/kidney stones/prostate problems): None  Sexually active: YES    Precipitating or alleviating factors: None    Therapies tried and outcome: cranberry juice    Outcome: No improvement           Problem list and histories reviewed & adjusted, as indicated.  Additional history: as documented    Patient Active Problem List   Diagnosis     BILATERAL CAROTID BRUITS     B-complex deficiency     BPH (benign prostatic hyperplasia)     Diverticulosis of colon with hemorrhage     Hyperlipidemia with target LDL less than 130     Advanced directives, counseling/discussion     CAD (coronary artery disease)     Hx of adenomatous colonic polyps     Prediabetes     Back pain     GERD (gastroesophageal reflux disease)     " [facility] of [facility] per pt request. Pt notes Pantoprazole was sent in packet form in error. New prescription sent for tablets.    CKD (chronic kidney disease) stage 3, GFR 30-59 ml/min     Benign hypertension with CKD (chronic kidney disease) stage III     Obesity (BMI 30-39.9)     Left medial knee pain     Obesity     AK (actinic keratosis)     Seborrheic keratosis     Past Surgical History:   Procedure Laterality Date     SURGICAL HISTORY OF -   03/27/00    Colonoscopy       Social History   Substance Use Topics     Smoking status: Never Smoker     Smokeless tobacco: Never Used     Alcohol use Yes      Comment: rarely     Family History   Problem Relation Age of Onset     Connective Tissue Disorder Mother      LUPUS     Cancer - colorectal Father      70         Current Outpatient Prescriptions   Medication Sig Dispense Refill     Ascorbic Acid (VITAMIN C PO)        aspirin 81 MG tablet Take by mouth daily.  30 0     atenolol (TENORMIN) 100 MG tablet TAKE ONE-HALF TABLET BY MOUTH ONCE DAILY 45 tablet 0     celecoxib (CELEBREX) 200 MG capsule TAKE ONE CAPSULE BY MOUTH ONCE DAILY AS NEEDED FOR PAIN 90 capsule 3     CVS VITAMIN B12 1000 MCG OR TABS 1 tablet daily       fenofibrate 54 MG tablet Take 1 tablet (54 mg) by mouth daily 90 tablet 0     finasteride (PROSCAR) 5 MG tablet TAKE ONE TABLET BY MOUTH ONCE DAILY 90 tablet 3     GuaiFENesin (MUCINEX PO)        lisinopril-hydrochlorothiazide (PRINZIDE/ZESTORETIC) 20-12.5 MG per tablet TAKE TWO TABLETS BY MOUTH ONCE DAILY 180 tablet 2     Multiple Vitamins-Minerals (ZINC PO)        multivitamin, therapeutic with minerals (THERA-VIT-M) TABS Take 1 tablet by mouth daily       naproxen sodium (ANAPROX) 220 MG tablet Take 1 tablet (220 mg) by mouth 2 times daily (with meals) 180 tablet 3     omeprazole (PRILOSEC) 40 MG capsule TAKE ONE CAPSULE BY MOUTH ONCE DAILY 90 capsule 3     rosuvastatin (CRESTOR) 40 MG tablet TAKE 1 TABLET BY MOUTH ONCE DAILY DUE  FOR  FULL  FASTING  PHYSICAL  FOR  FURTHER  REFILLS 90 tablet 3     tamsulosin (FLOMAX) 0.4 MG capsule Take 1 capsule (0.4 mg) by mouth daily 90  capsule 3     Allergies   Allergen Reactions     Penicillins      Pen-     Seasonal Allergies      Labs reviewed in EPIC    Reviewed and updated as needed this visit by clinical staff  Tobacco  Allergies  Meds  Med Hx  Surg Hx  Fam Hx  Soc Hx      Reviewed and updated as needed this visit by Provider         ROS:  Constitutional, HEENT, cardiovascular, pulmonary, GI, , musculoskeletal, neuro, skin, endocrine and psych systems are negative, except as otherwise noted.    This document serves as a record of the services and decisions personally performed and made by Jocelyne Boyer MD. It was created on her behalf by Sri Jenkins, a trained medical scribe. The creation of this document is based the provider's statements to the medical scribe.    Sri Jenkins May 15, 2018 1:38 PM  OBJECTIVE:     /60 (BP Location: Right arm, Patient Position: Sitting, Cuff Size: Adult Regular)  Pulse 64  Temp 97.8  F (36.6  C) (Oral)  Wt 92.4 kg (203 lb 12.8 oz)  SpO2 94%  BMI 32.16 kg/m2  Body mass index is 32.16 kg/(m^2).  GENERAL:  alert and no distress  RESP: lungs clear to auscultation - no rales, rhonchi or wheezes  CV: regular rate and rhythm, normal S1 S2, no S3 or S4, no murmur, click or rub, no peripheral edema   ABDOMEN: soft, nontender, and bowel sounds normal.  Unable to assess HSM or masses due to body habitus   MS: no gross musculoskeletal defects noted, no edema  BACK: no CVA tenderness, no paralumbar tenderness  PSYCH: mentation appears normal, affect normal/bright    Diagnostic Test Results:  Results for orders placed or performed in visit on 05/15/18 (from the past 24 hour(s))   *UA reflex to Microscopic and Culture ([place] and [facility] (except [place] and [place])   Result Value Ref Range    Color Urine Yellow     Appearance Urine Cloudy     Glucose Urine Negative NEG^Negative mg/dL    Bilirubin Urine Negative NEG^Negative    Ketones Urine Negative NEG^Negative mg/dL    Specific  Gravity Urine 1.025 1.003 - 1.035    Blood Urine Large (A) NEG^Negative    pH Urine 5.5 5.0 - 7.0 pH    Protein Albumin Urine 100 (A) NEG^Negative mg/dL    Urobilinogen Urine 0.2 0.2 - 1.0 EU/dL    Nitrite Urine Negative NEG^Negative    Leukocyte Esterase Urine Moderate (A) NEG^Negative    Source Midstream Urine    Urine Microscopic   Result Value Ref Range    WBC Urine  (A) OTO5^0 - 5 /HPF    RBC Urine  (A) OTO2^O - 2 /HPF    Squamous Epithelial /LPF Urine Few FEW^Few /LPF    Bacteria Urine Many (A) NEG^Negative /HPF   Urine Culture Aerobic Bacterial   Result Value Ref Range    Specimen Description Midstream Urine     Culture Micro       Referred to [facility], await final report.       ASSESSMENT/PLAN:   (N30.01) Acute cystitis with hematuria  (primary encounter diagnosis)  -urine with blood and WBC  -unclear why patient has developed bladder infection; encouraged him to see urology to consider cystoscopy   -will start cipro to cover prostate as well although patient has no pain  -await culture result   Plan: UROLOGY ADULT REFERRAL, ciprofloxacin (CIPRO)         500 MG tablet            (R30.0) Dysuria  Plan: *UA reflex to Microscopic and Culture ([place]         and [facility] (except [place] and         [place]), Urine Microscopic, PSA, screen,         CANCELED: PSA, screen           (N40.1) Benign prostatic hyperplasia with lower urinary tract symptoms, symptom details unspecified  -PSA today  -pt feels he is not emptying completely; will try increasing flomax to 0.8mg   -pt to monitor for dizziness/orthostasis   Plan: tamsulosin (FLOMAX) 0.4 MG capsule         (Z12.5) Encounter for screening for malignant neoplasm of prostate   Plan: PSA, screen,     (R82.90) Nonspecific finding on examination of urine  Plan: Urine Culture Aerobic Bacterial        The information in this document, created by the medical scribe for me, accurately reflects the services I  personally performed and the decisions made by me. I have reviewed and approved this document for accuracy prior to leaving the patient care area.  Jocelyne Boyer MD  [facility]

## 2023-10-19 NOTE — MR AVS SNAPSHOT
After Visit Summary   10/10/2017    Rk Aldana    MRN: [id number]           Patient Information     Date Of Birth          1935        Visit Information        Provider Department      10/10/2017 10:20 AM Jocelyne Boyer MD [facility]        Today's Diagnoses     Acute bronchitis, unspecified organism    -  1    Need for prophylactic vaccination against Streptococcus pneumoniae (pneumococcus)        Need for prophylactic vaccination with tetanus-diphtheria (TD)          Care Instructions    If things are not improving or getting worse I will need you to come back into clinic for an Xray.           Follow-ups after your visit        Your next 10 appointments already scheduled     Oct 24, 2017  1:15 PM CDT   Return Visit with Lupe Hyatt MD   [facility] ([facility])    [address] [place] [address]  [address]  [facility] [address] [phone]              Who to contact     If you have questions or need follow up information about today's clinic visit or your schedule please contact [facility] directly at [phone].  Normal or non-critical lab and imaging results will be communicated to you by [employer], letter or phone within 4 business days after the clinic has received the results. If you do not hear from us within 7 days, please contact the clinic through [employer] or phone. If you have a critical or abnormal lab result, we will notify you by phone as soon as possible.  Submit refill requests through [employer] or call your pharmacy and they will forward the refill request to us. Please allow 3 business days for your refill to be completed.          Additional Information About Your Visit        [employer] Information     [employer] gives you secure access to your electronic health record. If you see a primary care provider, you can also send messages to your care team and make appointments. If you have questions, please call your  "primary care clinic.  If you do not have a primary care provider, please call [phone] and they will assist you.        Care EveryWhere ID     This is your Care EveryWhere ID. This could be used by other organizations to access your [place] medical records  [id number]        Your Vitals Were     Pulse Temperature Height Pulse Oximetry BMI (Body Mass Index)       68 95.9  F (35.5  C) (Tympanic) 5' 6.75\" (1.695 m) 94% 32.76 kg/m2        Blood Pressure from Last 3 Encounters:   10/10/17 128/60   12/09/16 138/66   01/06/16 130/62    Weight from Last 3 Encounters:   10/10/17 207 lb 9.6 oz (94.2 kg)   12/09/16 208 lb 5 oz (94.5 kg)   01/06/16 212 lb 3 oz (96.2 kg)              Today, you had the following     No orders found for display         Today's Medication Changes          These changes are accurate as of: 10/10/17 11:19 AM.  If you have any questions, ask your nurse or doctor.               Start taking these medicines.        Dose/Directions    azithromycin 250 MG tablet   Commonly known as:  ZITHROMAX   Used for:  Acute bronchitis, unspecified organism   Started by:  Jocelyne Boyer MD        Two tablets first day, then one tablet daily for four days.   Quantity:  6 tablet   Refills:  0            Where to get your medicines      These medications were sent to [facility] [address] [place] [address] [place] [address]     Phone:  [phone]     azithromycin 250 MG tablet                Primary Care Provider Office Phone # Fax #    Jocelyne Boyer [phone]       [address] [place] [address] [address] [place] [address]        Equal Access to Services     [facility]: Hadii carlos Young, wamarissada lucristhian, qaybta kaalmada laxmi, fam hurd. So [facility] [phone].    ATENCIÓN: Si habla español, tiene a dickey disposición servicios gratuitos de asistencia lingüística. Llame al [phone].    We comply " with applicable federal civil rights laws and [place] laws. We do not discriminate on the basis of race, color, national origin, age, disability, sex, sexual orientation, or gender identity.            Thank you!     Thank you for choosing [facility] [place]  for your care. Our goal is always to provide you with excellent care. Hearing back from our patients is one way we can continue to improve our services. Please take a few minutes to complete the written survey that you may receive in the mail after your visit with us. Thank you!             Your Updated Medication List - Protect others around you: Learn how to safely use, store and throw away your medicines at www.disposemymeds.org.          This list is accurate as of: 10/10/17 11:19 AM.  Always use your most recent med list.                   Brand Name Dispense Instructions for use Diagnosis    aspirin 81 MG tablet     30    Take by mouth daily.        atenolol 50 MG tablet    TENORMIN    30 tablet    TAKE ONE TABLET BY MOUTH EVERY DAY    Benign hypertension with CKD (chronic kidney disease) stage III       azithromycin 250 MG tablet    ZITHROMAX    6 tablet    Two tablets first day, then one tablet daily for four days.    Acute bronchitis, unspecified organism       celecoxib 200 MG capsule    celeBREX    90 capsule    Take 1 capsule (200 mg) by mouth daily as needed for moderate pain    Osteoarthritis, unspecified osteoarthritis type, unspecified site       [facility] vitamin  B12 1000 MCG Tabs   Generic drug:  cyanocobalamin      1 tablet daily        fenofibrate 54 MG tablet     90 tablet    Take 1 tablet (54 mg) by mouth daily    Hyperlipidemia LDL goal <130       finasteride 5 MG tablet    PROSCAR    90 tablet    TAKE ONE TABLET BY MOUTH ONCE DAILY    Benign non-nodular prostatic hyperplasia without lower urinary tract symptoms       lisinopril-hydrochlorothiazide 20-12.5 MG per tablet    PRINZIDE/ZESTORETIC    180 tablet    Take 2 tablets by mouth  daily    Benign hypertension with CKD (chronic kidney disease) stage III       multivitamin, therapeutic with minerals Tabs tablet      Take 1 tablet by mouth daily        naproxen sodium 220 MG tablet    ANAPROX    180 tablet    Take 1 tablet (220 mg) by mouth 2 times daily (with meals)    Primary osteoarthritis involving multiple joints       omeprazole 40 MG capsule    priLOSEC    90 capsule    Take 1 capsule (40 mg) by mouth daily    Gastroesophageal reflux disease without esophagitis       rosuvastatin 40 MG tablet    CRESTOR    90 tablet    Take 1 tablet (40 mg) by mouth daily    Hyperlipidemia LDL goal <130       tamsulosin 0.4 MG capsule    FLOMAX    90 capsule    Take 1 capsule (0.4 mg) by mouth daily    Benign non-nodular prostatic hyperplasia without lower urinary tract symptoms          Estimated Blood Loss (Cc): minimal

## 2024-06-16 ENCOUNTER — HEALTH MAINTENANCE LETTER (OUTPATIENT)
Age: 89
End: 2024-06-16